# Patient Record
Sex: MALE | Race: OTHER | Employment: FULL TIME | URBAN - METROPOLITAN AREA
[De-identification: names, ages, dates, MRNs, and addresses within clinical notes are randomized per-mention and may not be internally consistent; named-entity substitution may affect disease eponyms.]

---

## 2017-11-03 RX ORDER — SIMVASTATIN 20 MG
TABLET ORAL
Qty: 90 TABLET | Refills: 0 | Status: SHIPPED | OUTPATIENT
Start: 2017-11-03 | End: 2018-04-10

## 2018-04-10 RX ORDER — SIMVASTATIN 20 MG
TABLET ORAL
Qty: 90 TABLET | Refills: 0 | Status: SHIPPED | OUTPATIENT
Start: 2018-04-10 | End: 2018-05-08 | Stop reason: ALTCHOICE

## 2018-04-10 NOTE — TELEPHONE ENCOUNTER
Dr. Joshua Norton: patient called and asked for refill for simvastatin. Informed him he needs to schedule physical appt and labs are needed. LOV 2016. I transferred him to  to schedule appt.    In the meantime, patient asked if you can refill his

## 2018-05-08 ENCOUNTER — OFFICE VISIT (OUTPATIENT)
Dept: INTERNAL MEDICINE CLINIC | Facility: CLINIC | Age: 58
End: 2018-05-08

## 2018-05-08 VITALS
HEART RATE: 67 BPM | WEIGHT: 227 LBS | HEIGHT: 73.75 IN | BODY MASS INDEX: 29.45 KG/M2 | DIASTOLIC BLOOD PRESSURE: 74 MMHG | SYSTOLIC BLOOD PRESSURE: 122 MMHG

## 2018-05-08 DIAGNOSIS — E78.00 HYPERCHOLESTEROLEMIA: ICD-10-CM

## 2018-05-08 DIAGNOSIS — D12.6 ADENOMATOUS POLYP OF COLON, UNSPECIFIED PART OF COLON: ICD-10-CM

## 2018-05-08 DIAGNOSIS — Z00.00 ANNUAL PHYSICAL EXAM: Primary | ICD-10-CM

## 2018-05-08 PROCEDURE — 99396 PREV VISIT EST AGE 40-64: CPT | Performed by: INTERNAL MEDICINE

## 2018-05-08 RX ORDER — ATORVASTATIN CALCIUM 10 MG/1
10 TABLET, FILM COATED ORAL NIGHTLY
Qty: 30 TABLET | Refills: 12 | Status: SHIPPED | OUTPATIENT
Start: 2018-05-08 | End: 2019-05-09

## 2018-05-08 NOTE — H&P
Kyler Zafar is a 56year old male who presents for a complete physical exam.   HPI:   His last visit here was for a physical October 2016. He feels well. He is interested in switching his cholesterol medication.   He stopped simvastatin for about 2 mon Alcohol use: Yes              Comment: 2 glasses of wine 4 days weekly          REVIEW OF SYSTEMS:   GENERAL: No fever  LUNGS: Recent chest congestion and cough productive of yellow sputum for the past 2 weeks now resolving.   No wheezing or shortness at which time an antibiotic will be prescribed. Anticipate colonoscopy October 2018. Annual physical.  - COMP METABOLIC PANEL (14); Future  - CBC, PLATELET; NO DIFFERENTIAL; Future  - LIPID PANEL; Future  - PSA SCREEN; Future    2.  Hypercholesterolemia

## 2018-05-08 NOTE — PATIENT INSTRUCTIONS
Call if chest congestion and cough do not continue to go away. Stop simvastatin and begin atorvastatin 10 mg daily. Obtain blood tests soon when you can. Continue to eat healthy and exercise regularly.   You will be due for repeat colonoscopy darling Martinez

## 2018-06-09 ENCOUNTER — APPOINTMENT (OUTPATIENT)
Dept: LAB | Facility: HOSPITAL | Age: 58
End: 2018-06-09
Attending: INTERNAL MEDICINE
Payer: COMMERCIAL

## 2018-06-09 DIAGNOSIS — Z00.00 ANNUAL PHYSICAL EXAM: ICD-10-CM

## 2018-06-09 PROCEDURE — 80053 COMPREHEN METABOLIC PANEL: CPT

## 2018-06-09 PROCEDURE — 36415 COLL VENOUS BLD VENIPUNCTURE: CPT

## 2018-06-09 PROCEDURE — 80061 LIPID PANEL: CPT

## 2018-06-09 PROCEDURE — 85027 COMPLETE CBC AUTOMATED: CPT

## 2018-09-10 ENCOUNTER — PATIENT MESSAGE (OUTPATIENT)
Dept: INTERNAL MEDICINE CLINIC | Facility: CLINIC | Age: 58
End: 2018-09-10

## 2018-09-10 NOTE — TELEPHONE ENCOUNTER
From: Preston Florez  To: Idris Salcedo MD  Sent: 9/10/2018 11:37 AM CDT  Subject: Referral Request    I have been having tenderness and swelling in my left elbow (and less often left wrist). Has been off and on for 6-8 weeks.  I have rested it, done some

## 2019-05-09 RX ORDER — ATORVASTATIN CALCIUM 10 MG/1
TABLET, FILM COATED ORAL
Qty: 30 TABLET | Refills: 1 | Status: SHIPPED | OUTPATIENT
Start: 2019-05-09 | End: 2019-07-12

## 2019-05-21 ENCOUNTER — APPOINTMENT (OUTPATIENT)
Dept: LAB | Facility: HOSPITAL | Age: 59
End: 2019-05-21
Attending: INTERNAL MEDICINE
Payer: COMMERCIAL

## 2019-05-21 ENCOUNTER — OFFICE VISIT (OUTPATIENT)
Dept: INTERNAL MEDICINE CLINIC | Facility: CLINIC | Age: 59
End: 2019-05-21
Payer: COMMERCIAL

## 2019-05-21 VITALS
HEART RATE: 64 BPM | WEIGHT: 234 LBS | SYSTOLIC BLOOD PRESSURE: 124 MMHG | BODY MASS INDEX: 30.35 KG/M2 | HEIGHT: 73.75 IN | DIASTOLIC BLOOD PRESSURE: 76 MMHG

## 2019-05-21 DIAGNOSIS — D12.6 ADENOMATOUS POLYP OF COLON, UNSPECIFIED PART OF COLON: ICD-10-CM

## 2019-05-21 DIAGNOSIS — Z00.00 ANNUAL PHYSICAL EXAM: ICD-10-CM

## 2019-05-21 DIAGNOSIS — E78.00 HYPERCHOLESTEROLEMIA: ICD-10-CM

## 2019-05-21 DIAGNOSIS — Z00.00 ANNUAL PHYSICAL EXAM: Primary | ICD-10-CM

## 2019-05-21 PROCEDURE — 85027 COMPLETE CBC AUTOMATED: CPT

## 2019-05-21 PROCEDURE — 80053 COMPREHEN METABOLIC PANEL: CPT

## 2019-05-21 PROCEDURE — 80061 LIPID PANEL: CPT

## 2019-05-21 PROCEDURE — 99396 PREV VISIT EST AGE 40-64: CPT | Performed by: INTERNAL MEDICINE

## 2019-05-21 PROCEDURE — 36415 COLL VENOUS BLD VENIPUNCTURE: CPT

## 2019-05-21 NOTE — H&P
Carlos Cornell is a 66year old male who presents for a complete physical exam.   HPI:   His last physical was May 2018. He feels well. No specific issues for discussion today. He is tolerating atorvastatin better than simvastatin.   Diet healthy, and he palpitations or chest pain  GI: No anorexia heartburn dysphagia nausea vomiting abdominal pain diarrhea constipation or rectal bleeding  : No urinary frequency dysuria or hematuria  MUSCULOSKELETAL: Recent left elbow pain, now resolved.   Occasional mild

## 2019-05-21 NOTE — PATIENT INSTRUCTIONS
Await results of today's blood tests. Please contact GI to arrange repeat colonoscopy. Continue current medication. Continue healthy diet and regular exercise with hopefully some weight loss.   Annual physical.

## 2019-07-13 RX ORDER — ATORVASTATIN CALCIUM 10 MG/1
TABLET, FILM COATED ORAL
Qty: 90 TABLET | Refills: 1 | Status: SHIPPED | OUTPATIENT
Start: 2019-07-13 | End: 2020-01-31

## 2019-07-13 NOTE — TELEPHONE ENCOUNTER
Refill passed per [facility]. AND [facility] protocol.   Cholesterol Medications  Protocol Criteria:  · Appointment scheduled in the past 12 months or in the next 3 months  · ALT & LDL on file in the past 12 months  · ALT result < 80  · LDL result <130   Recent Outpat

## 2020-01-31 RX ORDER — ATORVASTATIN CALCIUM 10 MG/1
TABLET, FILM COATED ORAL
Qty: 90 TABLET | Refills: 1 | Status: SHIPPED | OUTPATIENT
Start: 2020-01-31 | End: 2020-09-02

## 2020-01-31 NOTE — TELEPHONE ENCOUNTER
Refill passed per [facility] protocol.   Cholesterol Medications  Protocol Criteria:  · Appointment scheduled in the past 12 months or in the next 3 months  · ALT & LDL on file in the past 12 months  · ALT result < 80  · LDL result <130   Recent Outpat

## 2020-06-29 ENCOUNTER — TELEPHONE (OUTPATIENT)
Dept: GASTROENTEROLOGY | Facility: CLINIC | Age: 60
End: 2020-06-29

## 2020-06-29 ENCOUNTER — PATIENT MESSAGE (OUTPATIENT)
Dept: GASTROENTEROLOGY | Facility: CLINIC | Age: 60
End: 2020-06-29

## 2020-06-29 ENCOUNTER — VIRTUAL PHONE E/M (OUTPATIENT)
Dept: GASTROENTEROLOGY | Facility: CLINIC | Age: 60
End: 2020-06-29
Payer: COMMERCIAL

## 2020-06-29 VITALS — WEIGHT: 215 LBS | HEIGHT: 75 IN | BODY MASS INDEX: 26.73 KG/M2

## 2020-06-29 DIAGNOSIS — Z86.010 HISTORY OF COLON POLYPS: ICD-10-CM

## 2020-06-29 DIAGNOSIS — Z80.0 FAMILY HISTORY OF COLON CANCER: Primary | ICD-10-CM

## 2020-06-29 DIAGNOSIS — R19.4 ALTERED BOWEL HABITS: ICD-10-CM

## 2020-06-29 PROCEDURE — 99203 OFFICE O/P NEW LOW 30 MIN: CPT | Performed by: NURSE PRACTITIONER

## 2020-06-29 RX ORDER — POLYETHYLENE GLYCOL 3350, SODIUM CHLORIDE, SODIUM BICARBONATE, POTASSIUM CHLORIDE 420; 11.2; 5.72; 1.48 G/4L; G/4L; G/4L; G/4L
POWDER, FOR SOLUTION ORAL
Qty: 1 BOTTLE | Refills: 0 | Status: SHIPPED | OUTPATIENT
Start: 2020-06-29 | End: 2020-12-22 | Stop reason: ALTCHOICE

## 2020-06-29 NOTE — TELEPHONE ENCOUNTER
Scheduling: Please schedule the patient for a nonurgent colonoscopy related to a history of colon polyps/family history of colon cancer. Please note, he has an acute gastroenteritis at present the it seems to be self improving.   He is aware to schedule hi

## 2020-06-29 NOTE — TELEPHONE ENCOUNTER
Scheduled for:  Colonoscopy 39792  Provider Name:    Date: 8/7/20   Location:  [facility]  Sedation:  mac  Time:  1030am pt told will get call for arrival time  Prep:  colyte  Meds/Allergies Reconciled?:  Shahnaz CHRISTIANSENN reviewed    Diagnosis wi

## 2020-06-30 NOTE — TELEPHONE ENCOUNTER
Schedulers-Please see below MyChart message from this patient. He would like to reschedule his procedure.       Thank you      Visit Follow-up Question  6/29/2020 6:57 PM Reply    To: [facility] CLINICAL STAFF      From: Christie Florez      Created: 6/29/2020 6

## 2020-06-30 NOTE — TELEPHONE ENCOUNTER
Rescheduled for:  Colonoscopy 88975  Provider Name:    Date: 8/7/20 To :8/21/2020  Location:  [facility]  Sedation:  mac  Time: From: 1030am To: 9:45am  Meds/Allergies Reconciled?:  Steph TAO reviewed     Diagnosis with codes:  [facility] Z86.010

## 2020-06-30 NOTE — TELEPHONE ENCOUNTER
From: Jesus Florez  To: RAÚL Last  Sent: 6/29/2020 6:57 PM CDT  Subject: Visit Follow-up Question    I screwed up when she called to schedule my appointment. I took Friday Aug 7th but I can't do that date. I need to reschedule.  Don't know who

## 2020-08-10 ENCOUNTER — TELEPHONE (OUTPATIENT)
Dept: GASTROENTEROLOGY | Facility: CLINIC | Age: 60
End: 2020-08-10

## 2020-08-10 NOTE — TELEPHONE ENCOUNTER
Rescheduled for:  Colonoscopy 12929  Provider Name:  Dr. Quan Farley  Date:   From-8/21/20  To-10/23/20      Location:    [facility]  Sedation:  MAC  Time:     4220 (pt is aware that [facility] will call the day before to confirm arrival time)   Prep:  Trilyte, sent ne

## 2020-09-02 RX ORDER — ATORVASTATIN CALCIUM 10 MG/1
TABLET, FILM COATED ORAL
Qty: 90 TABLET | Refills: 0 | Status: SHIPPED | OUTPATIENT
Start: 2020-09-02 | End: 2020-09-28

## 2020-09-28 ENCOUNTER — TELEPHONE (OUTPATIENT)
Dept: GASTROENTEROLOGY | Facility: CLINIC | Age: 60
End: 2020-09-28

## 2020-09-28 RX ORDER — ATORVASTATIN CALCIUM 10 MG/1
TABLET, FILM COATED ORAL
Qty: 90 TABLET | Refills: 0 | Status: SHIPPED | OUTPATIENT
Start: 2020-09-28 | End: 2020-12-22

## 2020-09-28 NOTE — TELEPHONE ENCOUNTER
Patient requesting refill of Atrovastatin 10 mg today. States he will be making an appointment to see Dr. Reggie Omalley at the end of October.

## 2020-10-02 NOTE — TELEPHONE ENCOUNTER
Per phone room TE, pt needs to cancel & reschedule procedure. CBLM to reschedule procedure. Pt notified of CANCELLATION on VM box. Canceled in [address] Electronic case CANCEL request was sent to [facility] via OMP.     Please transfer to [facility] at ext 1

## 2020-10-05 ENCOUNTER — TELEPHONE (OUTPATIENT)
Dept: GASTROENTEROLOGY | Facility: CLINIC | Age: 60
End: 2020-10-05

## 2020-10-05 DIAGNOSIS — Z80.0 FAMILY HISTORY OF COLON CANCER: ICD-10-CM

## 2020-10-05 DIAGNOSIS — Z86.010 PERSONAL HISTORY OF COLONIC POLYPS: Primary | ICD-10-CM

## 2020-10-05 NOTE — TELEPHONE ENCOUNTER
Scheduled for:  Colonoscopy - 60334  Provider Name:  Dr. Viv Klein  Date:  12/18/20                    Location:   [facility]  Sedation:  MAC  Time:  Approx 10:45 am (pt is aware that [facility] will call the day before to confirm arrival time)   Prep:  Pattie, sent

## 2020-12-15 ENCOUNTER — LAB REQUISITION (OUTPATIENT)
Dept: LAB | Facility: HOSPITAL | Age: 60
End: 2020-12-15
Payer: COMMERCIAL

## 2020-12-15 DIAGNOSIS — Z01.818 ENCOUNTER FOR OTHER PREPROCEDURAL EXAMINATION: ICD-10-CM

## 2020-12-18 ENCOUNTER — LAB REQUISITION (OUTPATIENT)
Dept: LAB | Facility: HOSPITAL | Age: 60
End: 2020-12-18
Payer: COMMERCIAL

## 2020-12-18 ENCOUNTER — SURGERY CENTER DOCUMENTATION (OUTPATIENT)
Dept: SURGERY | Age: 60
End: 2020-12-18

## 2020-12-18 DIAGNOSIS — K63.5 POLYP OF COLON: ICD-10-CM

## 2020-12-18 PROCEDURE — 45385 COLONOSCOPY W/LESION REMOVAL: CPT | Performed by: INTERNAL MEDICINE

## 2020-12-18 PROCEDURE — 88305 TISSUE EXAM BY PATHOLOGIST: CPT | Performed by: INTERNAL MEDICINE

## 2020-12-18 NOTE — PROCEDURES
[facility] Endoscopy Report      Date of Procedure:  12/18/20      Preoperative Diagnosis:  1. Colorectal cancer screening  2. Family history of colon cancer  3.   Personal history of adenomatous colon polyp      Postoperative Zuleyka retrieved. Inspection of all sites revealed no evidence of ongoing bleeding. There were a few small diverticula seen in the sigmoid colon without signs of complication.   There were no other colonic polyps, mass lesions, vascular anomalies or signs of i

## 2020-12-22 ENCOUNTER — OFFICE VISIT (OUTPATIENT)
Dept: INTERNAL MEDICINE CLINIC | Facility: CLINIC | Age: 60
End: 2020-12-22
Payer: COMMERCIAL

## 2020-12-22 VITALS
HEIGHT: 73.75 IN | SYSTOLIC BLOOD PRESSURE: 112 MMHG | BODY MASS INDEX: 28.26 KG/M2 | WEIGHT: 217.88 LBS | HEART RATE: 59 BPM | DIASTOLIC BLOOD PRESSURE: 71 MMHG

## 2020-12-22 DIAGNOSIS — Z00.00 ANNUAL PHYSICAL EXAM: Primary | ICD-10-CM

## 2020-12-22 DIAGNOSIS — D12.6 ADENOMATOUS POLYP OF COLON, UNSPECIFIED PART OF COLON: ICD-10-CM

## 2020-12-22 DIAGNOSIS — E78.00 HYPERCHOLESTEROLEMIA: ICD-10-CM

## 2020-12-22 PROCEDURE — 99396 PREV VISIT EST AGE 40-64: CPT | Performed by: INTERNAL MEDICINE

## 2020-12-22 PROCEDURE — 3078F DIAST BP <80 MM HG: CPT | Performed by: INTERNAL MEDICINE

## 2020-12-22 PROCEDURE — 3074F SYST BP LT 130 MM HG: CPT | Performed by: INTERNAL MEDICINE

## 2020-12-22 PROCEDURE — 3008F BODY MASS INDEX DOCD: CPT | Performed by: INTERNAL MEDICINE

## 2020-12-22 PROCEDURE — 90471 IMMUNIZATION ADMIN: CPT | Performed by: INTERNAL MEDICINE

## 2020-12-22 PROCEDURE — 90686 IIV4 VACC NO PRSV 0.5 ML IM: CPT | Performed by: INTERNAL MEDICINE

## 2020-12-22 RX ORDER — ATORVASTATIN CALCIUM 10 MG/1
10 TABLET, FILM COATED ORAL NIGHTLY
Qty: 90 TABLET | Refills: 3 | Status: SHIPPED | OUTPATIENT
Start: 2020-12-22 | End: 2021-01-04

## 2020-12-22 NOTE — H&P
Chelsie Kenney is a 64year old male who presents for a complete physical exam, as well as for follow-up of hypercholesterolemia. HPI:   His last visit here was for a physical in May 2019. He feels well. No specific issues for discussion.   He is interes Mother 68   • Melanoma Mother    • Hypertension Mother    • Other (Lung Cancer) Mother 79   • Hypertension Brother       Social History:  Social History    Tobacco Use      Smoking status: Never Smoker      Smokeless tobacco: Never Used    Alcohol use: Yes to pharmacy. Reinforced healthy diet and regular exercise. Annual physical.  - atorvastatin 10 MG Oral Tab; Take 1 tablet (10 mg total) by mouth nightly. Dispense: 90 tablet;  Refill: 3  - FLULAVAL INFLUENZA VACCINE QUAD PRESERVATIVE FREE 0.5 ML  - COMP

## 2020-12-22 NOTE — PATIENT INSTRUCTIONS
Your physical today was normal.  You received a flu shot today. Please obtain blood tests soon when you can. Continue atorvastatin. Continue healthy diet and regular exercise. Return visit in 1 year.

## 2020-12-23 ENCOUNTER — TELEPHONE (OUTPATIENT)
Dept: GASTROENTEROLOGY | Facility: CLINIC | Age: 60
End: 2020-12-23

## 2020-12-23 NOTE — TELEPHONE ENCOUNTER
----- Message from Mario Alberto Barber MD sent at 12/23/2020  3:26 PM CST -----  I spoke to Geraldo Shepherd. He is feeling well. He had #6 adenomatous polyps removed. I have discussed the significance.   I have recommended a high-fiber diet for diverticulosis and

## 2020-12-23 NOTE — TELEPHONE ENCOUNTER
Entered into [facility]. Recall CLN in 3 years per Dr. Barrett Bass. Last CLN done 12/18/2020. Recall entered into Patient Outreach for 12/18/2023. HM updated.

## 2021-01-04 DIAGNOSIS — Z00.00 ANNUAL PHYSICAL EXAM: ICD-10-CM

## 2021-01-04 RX ORDER — ATORVASTATIN CALCIUM 10 MG/1
10 TABLET, FILM COATED ORAL NIGHTLY
Qty: 90 TABLET | Refills: 3 | Status: SHIPPED | OUTPATIENT
Start: 2021-01-04 | End: 2021-03-29 | Stop reason: DRUGHIGH

## 2021-01-08 ENCOUNTER — LAB ENCOUNTER (OUTPATIENT)
Dept: LAB | Facility: HOSPITAL | Age: 61
End: 2021-01-08
Attending: INTERNAL MEDICINE
Payer: COMMERCIAL

## 2021-01-08 DIAGNOSIS — Z00.00 ANNUAL PHYSICAL EXAM: ICD-10-CM

## 2021-01-08 LAB
ALBUMIN SERPL-MCNC: 3.9 G/DL (ref 3.4–5)
ALBUMIN/GLOB SERPL: 1.3 {RATIO} (ref 1–2)
ALP LIVER SERPL-CCNC: 89 U/L
ALT SERPL-CCNC: 68 U/L
ANION GAP SERPL CALC-SCNC: 5 MMOL/L (ref 0–18)
AST SERPL-CCNC: 31 U/L (ref 15–37)
BILIRUB SERPL-MCNC: 0.8 MG/DL (ref 0.1–2)
BUN BLD-MCNC: 17 MG/DL (ref 7–18)
BUN/CREAT SERPL: 18.5 (ref 10–20)
CALCIUM BLD-MCNC: 9.3 MG/DL (ref 8.5–10.1)
CHLORIDE SERPL-SCNC: 108 MMOL/L (ref 98–112)
CHOLEST SMN-MCNC: 233 MG/DL (ref ?–200)
CO2 SERPL-SCNC: 26 MMOL/L (ref 21–32)
COMPLEXED PSA SERPL-MCNC: 0.52 NG/ML (ref ?–4)
CREAT BLD-MCNC: 0.92 MG/DL
DEPRECATED RDW RBC AUTO: 44.3 FL (ref 35.1–46.3)
ERYTHROCYTE [DISTWIDTH] IN BLOOD BY AUTOMATED COUNT: 12.5 % (ref 11–15)
GLOBULIN PLAS-MCNC: 3.1 G/DL (ref 2.8–4.4)
GLUCOSE BLD-MCNC: 101 MG/DL (ref 70–99)
HCT VFR BLD AUTO: 43.5 %
HDLC SERPL-MCNC: 53 MG/DL (ref 40–59)
HGB BLD-MCNC: 15.1 G/DL
LDLC SERPL CALC-MCNC: 151 MG/DL (ref ?–100)
M PROTEIN MFR SERPL ELPH: 7 G/DL (ref 6.4–8.2)
MCH RBC QN AUTO: 33.6 PG (ref 26–34)
MCHC RBC AUTO-ENTMCNC: 34.7 G/DL (ref 31–37)
MCV RBC AUTO: 96.7 FL
NONHDLC SERPL-MCNC: 180 MG/DL (ref ?–130)
OSMOLALITY SERPL CALC.SUM OF ELEC: 290 MOSM/KG (ref 275–295)
PATIENT FASTING Y/N/NP: YES
PATIENT FASTING Y/N/NP: YES
PLATELET # BLD AUTO: 184 10(3)UL (ref 150–450)
POTASSIUM SERPL-SCNC: 3.8 MMOL/L (ref 3.5–5.1)
RBC # BLD AUTO: 4.5 X10(6)UL
SARS-COV-2 IGG+IGM SERPL QL IA: REACTIVE
SODIUM SERPL-SCNC: 139 MMOL/L (ref 136–145)
TRIGL SERPL-MCNC: 144 MG/DL (ref 30–149)
VLDLC SERPL CALC-MCNC: 29 MG/DL (ref 0–30)
WBC # BLD AUTO: 5.6 X10(3) UL (ref 4–11)

## 2021-01-08 PROCEDURE — 86769 SARS-COV-2 COVID-19 ANTIBODY: CPT

## 2021-01-08 PROCEDURE — 80061 LIPID PANEL: CPT

## 2021-01-08 PROCEDURE — 36415 COLL VENOUS BLD VENIPUNCTURE: CPT

## 2021-01-08 PROCEDURE — 80053 COMPREHEN METABOLIC PANEL: CPT

## 2021-01-08 PROCEDURE — 85027 COMPLETE CBC AUTOMATED: CPT

## 2021-03-29 ENCOUNTER — PATIENT MESSAGE (OUTPATIENT)
Dept: INTERNAL MEDICINE CLINIC | Facility: CLINIC | Age: 61
End: 2021-03-29

## 2021-03-29 RX ORDER — ATORVASTATIN CALCIUM 20 MG/1
20 TABLET, FILM COATED ORAL NIGHTLY
Qty: 90 TABLET | Refills: 2 | Status: SHIPPED | OUTPATIENT
Start: 2021-03-29 | End: 2021-03-31

## 2021-03-29 NOTE — TELEPHONE ENCOUNTER
From: Amita Florez  To: Amira Elise MD  Sent: 3/29/2021 7:31 AM CDT  Subject: Prescription Question    Your comment on my COMP METABOLIC PANEL 9/5/18 test result recommended to go to 20mg Atorvastatin.  My current prescription is up and I'd like to inc

## 2021-03-31 RX ORDER — ATORVASTATIN CALCIUM 20 MG/1
20 TABLET, FILM COATED ORAL NIGHTLY
Qty: 90 TABLET | Refills: 2 | Status: SHIPPED | OUTPATIENT
Start: 2021-03-31

## 2022-01-11 ENCOUNTER — NURSE TRIAGE (OUTPATIENT)
Dept: INTERNAL MEDICINE CLINIC | Facility: CLINIC | Age: 62
End: 2022-01-11

## 2022-01-11 ENCOUNTER — PATIENT MESSAGE (OUTPATIENT)
Dept: INTERNAL MEDICINE CLINIC | Facility: CLINIC | Age: 62
End: 2022-01-11

## 2022-01-11 NOTE — TELEPHONE ENCOUNTER
Lisa Joe RN 1/11/2022 11:12 AM CST        ----- Message -----  From: Shima Florez  Sent: 1/11/2022 11:11 AM CST  To: Em Rn Triage  Subject: Trigger Thumb     I have had ongoing issue (last 4 months or so) with my left thumb.  I believe it is time t

## 2022-01-11 NOTE — TELEPHONE ENCOUNTER
Action Requested: Summary for Provider     []  Critical Lab, Recommendations Needed  [x] Need Additional Advice  []   FYI    []   Need Orders  [] Need Medications Sent to Pharmacy  []  Other     SUMMARY: Patient stated that has been having trouble with his

## 2022-01-11 NOTE — TELEPHONE ENCOUNTER
----- Message from Pierre Farfan RN sent at 1/11/2022 11:12 AM CST -----  Regarding: FW: Trigger Thumb      ----- Message -----  From: Ar Florez  Sent: 1/11/2022  11:11 AM CST  To: Ruthy Salazar Triage  Subject: Trigger Fabiola Hall

## 2022-01-11 NOTE — TELEPHONE ENCOUNTER
MD nicci Leyva          1/11/22 3:44 PM  [facility], you can schedule an appointment with Orthopedics (Dr. Kaylan Cobos, Dr. Lois Pringle or Dr. Michaela Quigley) at [phone]    Last read by Aleshia Parrish at 3:58 PM on 1/11/2022.

## 2022-01-31 ENCOUNTER — HOSPITAL ENCOUNTER (OUTPATIENT)
Dept: GENERAL RADIOLOGY | Facility: HOSPITAL | Age: 62
Discharge: HOME | End: 2022-01-31
Attending: ORTHOPAEDIC SURGERY
Payer: COMMERCIAL

## 2022-01-31 ENCOUNTER — OFFICE VISIT (OUTPATIENT)
Dept: ORTHOPEDICS CLINIC | Facility: CLINIC | Age: 62
End: 2022-01-31
Payer: COMMERCIAL

## 2022-01-31 VITALS — SYSTOLIC BLOOD PRESSURE: 114 MMHG | HEART RATE: 58 BPM | DIASTOLIC BLOOD PRESSURE: 67 MMHG

## 2022-01-31 DIAGNOSIS — M79.645 PAIN OF LEFT THUMB: ICD-10-CM

## 2022-01-31 DIAGNOSIS — M19.042 DEGENERATIVE ARTHRITIS OF METACARPOPHALANGEAL JOINT OF LEFT THUMB: ICD-10-CM

## 2022-01-31 DIAGNOSIS — M18.12 ARTHRITIS OF CARPOMETACARPAL (CMC) JOINT OF LEFT THUMB: Primary | ICD-10-CM

## 2022-01-31 PROCEDURE — 99244 OFF/OP CNSLTJ NEW/EST MOD 40: CPT | Performed by: ORTHOPAEDIC SURGERY

## 2022-01-31 PROCEDURE — 3078F DIAST BP <80 MM HG: CPT | Performed by: ORTHOPAEDIC SURGERY

## 2022-01-31 PROCEDURE — 3074F SYST BP LT 130 MM HG: CPT | Performed by: ORTHOPAEDIC SURGERY

## 2022-01-31 PROCEDURE — 73130 X-RAY EXAM OF HAND: CPT | Performed by: ORTHOPAEDIC SURGERY

## 2022-01-31 PROCEDURE — 20600 DRAIN/INJ JOINT/BURSA W/O US: CPT | Performed by: ORTHOPAEDIC SURGERY

## 2022-01-31 RX ORDER — TRIAMCINOLONE ACETONIDE 40 MG/ML
40 INJECTION, SUSPENSION INTRA-ARTICULAR; INTRAMUSCULAR ONCE
Status: DISCONTINUED | OUTPATIENT
Start: 2022-01-31 | End: 2022-01-31

## 2022-01-31 RX ORDER — TRIAMCINOLONE ACETONIDE 40 MG/ML
20 INJECTION, SUSPENSION INTRA-ARTICULAR; INTRAMUSCULAR ONCE
Status: COMPLETED | OUTPATIENT
Start: 2022-01-31 | End: 2022-01-31

## 2022-01-31 RX ADMIN — TRIAMCINOLONE ACETONIDE 20 MG: 40 INJECTION, SUSPENSION INTRA-ARTICULAR; INTRAMUSCULAR at 12:53:00

## 2022-01-31 NOTE — H&P
NURSING INTAKE COMMENTS: Patient presents with:  Finger Pain: Left thumb - onset during the summer, no injury - has pain in the thumb with radiation into the forearm rated as 4-5/10 on and off - sometimes has numbness and tingling in the fingers - pt is R Use      Smoking status: Never Smoker      Smokeless tobacco: Never Used    Vaping Use      Vaping Use: Never used    Substance and Sexual Activity      Alcohol use: Yes        Comment: 2 glasses of wine 4 days weekly      Drug use: No      Sexual activity (MSJ=54043)    Result Date: 1/31/2022  PROCEDURE: XR HAND (MIN 3 VIEWS), LEFT (CPT=73130)  COMPARISON: None. INDICATIONS: Intermittent left thumb pain, swelling, and  weakness post injury 5/2021. TECHNIQUE:    Three views were obtained.   FINDINGS: PARTHA CMC joint with bupivacaine 0.5% half cc and Kenalog half cc. He will learn the home exercise program from the therapist.  For now we will see him as needed. We talked about icing and ibuprofen use tonight if the injection site is a little sore.     Follow

## 2022-01-31 NOTE — PROGRESS NOTES
Per verbal order from Dr. Claudio Alba, draw up .5 of 0.5% Marcaine and .5 of Kenalog 40 for cortisone injection to left thumb Nallely Turner CMA  Patient provided education handout for cortisone injection.    Patient left office prior to obtaining post injection vit

## 2022-05-04 ENCOUNTER — LAB ENCOUNTER (OUTPATIENT)
Dept: LAB | Facility: HOSPITAL | Age: 62
End: 2022-05-04
Attending: INTERNAL MEDICINE
Payer: COMMERCIAL

## 2022-05-04 ENCOUNTER — OFFICE VISIT (OUTPATIENT)
Dept: INTERNAL MEDICINE CLINIC | Facility: CLINIC | Age: 62
End: 2022-05-04
Payer: COMMERCIAL

## 2022-05-04 VITALS
HEIGHT: 73.75 IN | SYSTOLIC BLOOD PRESSURE: 118 MMHG | BODY MASS INDEX: 27.48 KG/M2 | RESPIRATION RATE: 16 BRPM | WEIGHT: 211.81 LBS | HEART RATE: 53 BPM | DIASTOLIC BLOOD PRESSURE: 72 MMHG

## 2022-05-04 DIAGNOSIS — D12.6 ADENOMATOUS POLYP OF COLON, UNSPECIFIED PART OF COLON: ICD-10-CM

## 2022-05-04 DIAGNOSIS — Z00.00 ANNUAL PHYSICAL EXAM: Primary | ICD-10-CM

## 2022-05-04 DIAGNOSIS — E78.00 HYPERCHOLESTEROLEMIA: ICD-10-CM

## 2022-05-04 DIAGNOSIS — Z00.00 ANNUAL PHYSICAL EXAM: ICD-10-CM

## 2022-05-04 LAB
ALBUMIN SERPL-MCNC: 3.9 G/DL (ref 3.4–5)
ALBUMIN/GLOB SERPL: 1.3 {RATIO} (ref 1–2)
ALP LIVER SERPL-CCNC: 69 U/L
ALT SERPL-CCNC: 37 U/L
ANION GAP SERPL CALC-SCNC: 3 MMOL/L (ref 0–18)
AST SERPL-CCNC: 27 U/L (ref 15–37)
BILIRUB SERPL-MCNC: 0.9 MG/DL (ref 0.1–2)
BUN BLD-MCNC: 15 MG/DL (ref 7–18)
BUN/CREAT SERPL: 14.9 (ref 10–20)
CALCIUM BLD-MCNC: 8.4 MG/DL (ref 8.5–10.1)
CHLORIDE SERPL-SCNC: 106 MMOL/L (ref 98–112)
CHOLEST SERPL-MCNC: 168 MG/DL (ref ?–200)
CO2 SERPL-SCNC: 29 MMOL/L (ref 21–32)
COMPLEXED PSA SERPL-MCNC: 0.59 NG/ML (ref ?–4)
CREAT BLD-MCNC: 1.01 MG/DL
DEPRECATED RDW RBC AUTO: 45.2 FL (ref 35.1–46.3)
ERYTHROCYTE [DISTWIDTH] IN BLOOD BY AUTOMATED COUNT: 12.2 % (ref 11–15)
FASTING PATIENT LIPID ANSWER: YES
FASTING STATUS PATIENT QL REPORTED: YES
GLOBULIN PLAS-MCNC: 3 G/DL (ref 2.8–4.4)
GLUCOSE BLD-MCNC: 89 MG/DL (ref 70–99)
HCT VFR BLD AUTO: 43 %
HDLC SERPL-MCNC: 56 MG/DL (ref 40–59)
HGB BLD-MCNC: 14.3 G/DL
LDLC SERPL CALC-MCNC: 96 MG/DL (ref ?–100)
MCH RBC QN AUTO: 33.1 PG (ref 26–34)
MCHC RBC AUTO-ENTMCNC: 33.3 G/DL (ref 31–37)
MCV RBC AUTO: 99.5 FL
NONHDLC SERPL-MCNC: 112 MG/DL (ref ?–130)
OSMOLALITY SERPL CALC.SUM OF ELEC: 286 MOSM/KG (ref 275–295)
PLATELET # BLD AUTO: 167 10(3)UL (ref 150–450)
POTASSIUM SERPL-SCNC: 4.1 MMOL/L (ref 3.5–5.1)
PROT SERPL-MCNC: 6.9 G/DL (ref 6.4–8.2)
RBC # BLD AUTO: 4.32 X10(6)UL
SODIUM SERPL-SCNC: 138 MMOL/L (ref 136–145)
TRIGL SERPL-MCNC: 89 MG/DL (ref 30–149)
VLDLC SERPL CALC-MCNC: 15 MG/DL (ref 0–30)
WBC # BLD AUTO: 5.2 X10(3) UL (ref 4–11)

## 2022-05-04 PROCEDURE — 3078F DIAST BP <80 MM HG: CPT | Performed by: INTERNAL MEDICINE

## 2022-05-04 PROCEDURE — 36415 COLL VENOUS BLD VENIPUNCTURE: CPT

## 2022-05-04 PROCEDURE — 85027 COMPLETE CBC AUTOMATED: CPT

## 2022-05-04 PROCEDURE — 99396 PREV VISIT EST AGE 40-64: CPT | Performed by: INTERNAL MEDICINE

## 2022-05-04 PROCEDURE — 3008F BODY MASS INDEX DOCD: CPT | Performed by: INTERNAL MEDICINE

## 2022-05-04 PROCEDURE — 3074F SYST BP LT 130 MM HG: CPT | Performed by: INTERNAL MEDICINE

## 2022-05-04 PROCEDURE — 80053 COMPREHEN METABOLIC PANEL: CPT

## 2022-05-04 PROCEDURE — 80061 LIPID PANEL: CPT

## 2022-05-04 RX ORDER — ATORVASTATIN CALCIUM 20 MG/1
20 TABLET, FILM COATED ORAL NIGHTLY
Qty: 90 TABLET | Refills: 2 | Status: CANCELLED | OUTPATIENT
Start: 2022-05-04

## 2022-05-04 NOTE — PATIENT INSTRUCTIONS
Await results of today's blood tests. Consider getting 2 doses of Shingrix vaccine at your pharmacy. Continue healthy diet and regular exercise. Annual physical.  Consider seeing GI soon to discuss difficulty swallowing pills.

## 2022-06-28 ENCOUNTER — TELEPHONE (OUTPATIENT)
Dept: ORTHOPEDICS CLINIC | Facility: CLINIC | Age: 62
End: 2022-06-28

## 2022-06-28 NOTE — TELEPHONE ENCOUNTER
S/w pt and he states 1 month ago he was lifting a grill and after he had severe pain in left hip /groin. He used ice/heat, took advil and pain got better but then he was lifting something again this morning and pain returned. I advised pt he should be evaluated at [facility] or PCP. No openings in ortho for several weeks. He will CB if he wants to schedule appt in ortho after being evaluated by UC or PCP.

## 2022-06-28 NOTE — TELEPHONE ENCOUNTER
Per pt is having bad hip pain, states he cannot wait until next available appt, requesting to speak to RN. Please call thank you.

## 2022-06-29 ENCOUNTER — OFFICE VISIT (OUTPATIENT)
Dept: INTERNAL MEDICINE CLINIC | Facility: CLINIC | Age: 62
End: 2022-06-29
Payer: COMMERCIAL

## 2022-06-29 ENCOUNTER — HOSPITAL ENCOUNTER (OUTPATIENT)
Dept: GENERAL RADIOLOGY | Facility: HOSPITAL | Age: 62
Discharge: HOME | End: 2022-06-29
Attending: INTERNAL MEDICINE
Payer: COMMERCIAL

## 2022-06-29 VITALS
SYSTOLIC BLOOD PRESSURE: 108 MMHG | HEART RATE: 67 BPM | BODY MASS INDEX: 27.82 KG/M2 | HEIGHT: 73.75 IN | WEIGHT: 214.5 LBS | DIASTOLIC BLOOD PRESSURE: 69 MMHG

## 2022-06-29 DIAGNOSIS — R10.32 LEFT GROIN PAIN: Primary | ICD-10-CM

## 2022-06-29 DIAGNOSIS — R10.32 LEFT GROIN PAIN: ICD-10-CM

## 2022-06-29 PROCEDURE — 73502 X-RAY EXAM HIP UNI 2-3 VIEWS: CPT | Performed by: INTERNAL MEDICINE

## 2022-06-29 PROCEDURE — 3008F BODY MASS INDEX DOCD: CPT | Performed by: INTERNAL MEDICINE

## 2022-06-29 PROCEDURE — 3078F DIAST BP <80 MM HG: CPT | Performed by: INTERNAL MEDICINE

## 2022-06-29 PROCEDURE — 99213 OFFICE O/P EST LOW 20 MIN: CPT | Performed by: INTERNAL MEDICINE

## 2022-06-29 PROCEDURE — 3074F SYST BP LT 130 MM HG: CPT | Performed by: INTERNAL MEDICINE

## 2022-06-29 RX ORDER — ATORVASTATIN CALCIUM 20 MG/1
20 TABLET, FILM COATED ORAL NIGHTLY
Qty: 90 TABLET | Refills: 3 | Status: SHIPPED | OUTPATIENT
Start: 2022-06-29

## 2022-06-29 RX ORDER — NAPROXEN 500 MG/1
500 TABLET ORAL 2 TIMES DAILY WITH MEALS
Qty: 30 TABLET | Refills: 0 | Status: SHIPPED | OUTPATIENT
Start: 2022-06-29 | End: 2023-06-24

## 2022-06-29 NOTE — PATIENT INSTRUCTIONS
Await results of left hip x-ray. Please rest and apply heat or ice to your left hip 2-3 times daily. Take naproxen 500 mg twice daily with meals. Call if not soon better.

## 2022-08-07 NOTE — H&P
[facility] Medicine Services  HISTORY AND PHYSICAL    Patient Name: Shira Rogers  : 1945  MRN: [id number]  Primary Care Physician: Miguel Mazariegos MD  Date of admission: 2022      Subjective   Subjective     Chief Complaint: fall, left elbow and knee pain    HPI:  Shira Rogers is a 77 y.o. female with history of hypertension, type 2 diabetes, hypothyroidism on Synthroid, hyperlipidemia, dizziness on meclizine who presents to the ED s/p fall.  She states that she was about to go down some steps when she lost her balance and fell. She does endorse congestion and dizziness and has been taking meclizine. She denies any f/c, no n/v/d. On arrival to the ED patient was hypertensive, initial lab work-up was unremarkable, however, x-ray was concerning for distal left humerus fracture, and left patella fracture.  Ortho was consulted, and hospital medicine was asked to admit.    Review of Systems   Gen- No fevers, chills  CV- No chest pain, palpitations  Resp- No cough, dyspnea  GI- No N/V/D, abd pain    All other systems reviewed and are negative.     Personal History     No past medical history on file.      No past surgical history on file.    Family History:  family history is not on file. Otherwise pertinent FHx was reviewed and unremarkable.     Social History:    Social History     Social History Narrative   • Not on file       Medications:  Available home medication information reviewed.  (Not in a hospital admission)      No Known Allergies    Objective   Objective     Vital Signs:   Temp:  [98.6 °F (37 °C)] 98.6 °F (37 °C)  Heart Rate:  [71-87] 81  Resp:  [18] 18  BP: (137-164)/(81-92) 137/92       Physical Exam   Constitutional: Awake, alert  Eyes: PERRLA, sclerae anicteric, no conjunctival injection  HENT: NCAT, mucous membranes moist  Neck: Supple, no thyromegaly, no lymphadenopathy, trachea midline  Respiratory: Clear to auscultation bilaterally, nonlabored respirations  PATRICIA Marielle Tele-visit    Parent/Caregiver verbally consents to a Virtual/Telephone Check-In service on 6/29/2020    Patient understands and accepts financial responsibility for any deductible, co-insurance and/or co-pays associated with this service.     This   Cardiovascular: RRR, no murmurs, rubs, or gallops, palpable pedal pulses bilaterally  Gastrointestinal: Positive bowel sounds, soft, nontender, nondistended  Musculoskeletal: No bilateral ankle edema, left upper extremity under splint, left leg in brace, no clubbing or cyanosis to extremities  Psychiatric: Appropriate affect, cooperative  Neurologic: Oriented x 3, strength symmetric in all extremities, Cranial Nerves grossly intact to confrontation, speech clear  Skin: No rashes    Result Review:  I have personally reviewed the results from the time of this admission to 8/7/2022 11:14 EDT and agree with these findings:  [x]  Laboratory list / accordion  []  Microbiology  [x]  Radiology  []  EKG/Telemetry   []  Cardiology/Vascular   []  Pathology  []  Old records  []  Other:  Most notable findings include:    LAB RESULTS:      Lab 08/07/22  0827   WBC 10.66   HEMOGLOBIN 12.1   HEMATOCRIT 34.9   PLATELETS 174   NEUTROS ABS 7.95*   IMMATURE GRANS (ABS) 0.04   LYMPHS ABS 1.78   MONOS ABS 0.63   EOS ABS 0.22   MCV 85.5         Lab 08/07/22  0827   SODIUM 135*   POTASSIUM 4.1   CHLORIDE 103   CO2 23.0   ANION GAP 9.0   BUN 23   CREATININE 0.70   EGFR 90.3   GLUCOSE 294*   CALCIUM 9.0         Lab 08/07/22  0827   TOTAL PROTEIN 6.7   ALBUMIN 4.00   GLOBULIN 2.7   ALT (SGPT) 15   AST (SGOT) 17   BILIRUBIN 0.3   ALK PHOS 91                         Microbiology Results (last 10 days)     ** No results found for the last 240 hours. **          XR ELBOW 2 VIEW LEFT    Result Date: 8/7/2022  DATE OF EXAM: 8/7/2022 7:39 AM  PROCEDURE: XR ELBOW 2 VW LEFT-  INDICATIONS: fall  COMPARISON: No comparisons available.  TECHNIQUE: Two Radiologic views of the left elbow were obtained.  FINDINGS: There is an acute supracondylar fracture of the distal humerus which on AP view appears to span transversely just proximal from the medial to the lateral epicondyles. Moderate effusion is present.      Impression: There is an acute supracondylar  Occupation: Real Estate   - Lives with spouse  - NSAIDs/ASA use: PRN    Past Medical History:   Diagnosis Date   • Colon polyps October 2013   • Hypercholesterolemia       Past Surgical History:   Procedure Laterality Date   • EYE SURGERY Bilateral Decembe cooperative     ASSESSMENT AND PLAN:   Chapin Hernandez is a 64year old year-old male pt of Dr. Kyra Taylor with history of colon polyps, hypercholesteremia, who presents for tele-visit. 1. Altered bowel habits:  The patient reports the onset of diarrheal sto fracture of the distal humerus which on AP view appears to span transversely just proximal from the medial to the lateral epicondyles. Moderate effusion is present.  This report was finalized on 8/7/2022 8:06 AM by Isac Avelar.      XR Knee 1 or 2 View Left    Result Date: 8/7/2022  DATE OF EXAM: 8/7/2022 7:39 AM  PROCEDURE: XR KNEE 1 OR 2 VW LEFT-  INDICATIONS: left knee  COMPARISON: No comparisons available.  TECHNIQUE: One to two radiologic views of left knee were obtained.  FINDINGS: There is cortical disruption along the superior pole of the patella concerning for acute minimally displaced fracture. Cortical margins are otherwise intact and alignment appears grossly maintained. Tricompartmental arthrosis changes are present, fairly severe. There is a moderate to large dense suprapatellar effusion, likely some component of hemarthrosis.      Impression: There is cortical disruption along the superior pole of the patella concerning for acute minimally displaced fracture. Cortical margins are otherwise intact and alignment appears grossly maintained. Tricompartmental arthrosis changes are present, fairly severe. There is a moderate to large dense suprapatellar effusion, likely some component of hemarthrosis.  This report was finalized on 8/7/2022 8:02 AM by Isac Avelar.            Assessment & Plan   Assessment & Plan     Active Hospital Problems    Diagnosis  POA   • **Fall [W19.XXXA]  Yes   • Type 2 diabetes mellitus (HCC) [E11.9]  Yes   • Hypothyroidism [E03.9]  Yes   • Essential hypertension [I10]  Yes   • Left supracondylar humerus fracture [S42.412A]  Yes   • Closed fracture of left patella [S82.002A]  Yes     77-year-old female with history of hypertension, hyperlipidemia, type 2 diabetes, hypothyroidism, chronic dizziness who presents to the ED status post fall, found to have  Left patella and left humerus fracture    Left patella fracture, left distal humerus fracture s/p fall  -Mechanism of  Yes  -Prep: Split dose Colyte/TriLyte or equivalent  -Anti-platelets and anti-coagulants: None  -Diabetes meds: None    ** If MAC @ EM/NE:    - HOLD ACE/ARBs the night before and/or the day of the procedure(s) - N/A   - NO alcohol, recreational drugs nor fall unknown, likely mechanical, patient with history of dizziness  -Left elbow and knee x-ray as above  -Ortho has been consulted, recommend nonoperative management for now, nonweightbearing LUE, WBAT LLE with knee immobilizer  -Continue pain control, PT/OT to evaluate    Type 2 diabetes  -Control unknown, will get A1c, continue SSI    Hyperlipidemia-continue statin once home med rec is done    Hypothyroidism  -Check baseline TSH, continue home Synthroid once home med rec is done    Hypertension  -BP currently stable, not on any meds  -Continue to monitor for now.    DVT prophylaxis: Mechanical    Addendum:  Screening COVID returned positive, patient is asymptomatic. She is vaccinated and boosted x1, we will place in airborne precautions    CODE STATUS:  full  Code Status and Medical Interventions:   Ordered at: 08/07/22 1113     Level Of Support Discussed With:    Patient     Code Status (Patient has no pulse and is not breathing):    CPR (Attempt to Resuscitate)     Medical Interventions (Patient has pulse or is breathing):    Full Support       Loan Sanz MD  08/07/22   provider-patient relationship, due to the ongoing public health crisis/national emergency and because of restrictions of visitation. There are limitations of this visit as limited physical exam could be performed.   Every conscious effort was taken to allo

## 2022-09-30 RX ORDER — ATORVASTATIN CALCIUM 20 MG/1
20 TABLET, FILM COATED ORAL NIGHTLY
Qty: 90 TABLET | Refills: 1 | Status: SHIPPED | OUTPATIENT
Start: 2022-09-30

## 2022-09-30 NOTE — TELEPHONE ENCOUNTER
Refill passed per [employer] [employer] protocol    Requested Prescriptions   Pending Prescriptions Disp Refills    atorvastatin 20 MG Oral Tab 90 tablet 3     Sig: Take 1 tablet (20 mg total) by mouth nightly.         Cholesterol Medication Protocol Passed - 9/30/2022 11:37 AM        Passed - ALT in past 12 months        Passed - LDL in past 12 months        Passed - Last ALT < 80       Lab Results   Component Value Date    ALT 37 05/04/2022             Passed - Last LDL < 130     Lab Results   Component Value Date    LDL 96 05/04/2022               Passed - In person appointment or virtual visit in the past 12 mos or appointment in next 3 mos       Recent Outpatient Visits              3 months ago Left groin pain    [facility], [address], Estela Nicholson MD    Office Visit    4 months ago Annual physical exam    [address] [facility], Estela Nicholson MD    Office Visit    8 months ago Arthritis of carpometacarpal O'Brien) joint of left thumb    [facility] Marcelino Navarrete, Tutu Carrera MD    Office Visit    1 year ago Annual physical exam    [facility], Estela Nicholson MD    Office Visit    2 years ago Family history of colon cancer    [employer], [facility], [address] [facility]    Virtual Phone E/M                           Recent Outpatient Visits              3 months ago Left groin pain    [facility]    Office Visit    4 months ago Annual physical exam    [facility]    Office Visit    8 months ago Arthritis of carpometacarpal O'Brien) joint of left thumb    [facility] Tutu Altamirano MD    Office Visit    1 year ago Annual physical exam    Rochelle Prajapati MD    Office Visit    2 years ago Family history of colon cancer    [facility], [address] [facility] [place], RAÚL Interiano    Virtual Phone E/M

## 2022-10-11 ENCOUNTER — PATIENT MESSAGE (OUTPATIENT)
Dept: GASTROENTEROLOGY | Facility: CLINIC | Age: 62
End: 2022-10-11

## 2022-10-11 RX ORDER — POLYETHYLENE GLYCOL 3350, SODIUM CHLORIDE, SODIUM BICARBONATE, POTASSIUM CHLORIDE 420; 11.2; 5.72; 1.48 G/4L; G/4L; G/4L; G/4L
4 POWDER, FOR SOLUTION ORAL ONCE
Qty: 4000 ML | Refills: 0 | Status: SHIPPED | OUTPATIENT
Start: 2022-10-11 | End: 2022-10-11 | Stop reason: CLARIF

## 2022-10-11 NOTE — TELEPHONE ENCOUNTER
Last Procedure, Date, MD:  12/18/2020, colonoscopy, Dr Jaylen Melendez  Last Diagnosis:  Tubular adenoma, sessile serrated adenoma  Recalled for (mth/yrs): 3 years  Sedation used previously:  MAC  Last Prep Used (if known):  trilyte  Quality of prep (if known): very good  Anticoagulants: no  Diabetic Meds: no  BP meds(Ace inhibitors/ARB's):no  Weight loss meds (phentermine/vyvanse):  Iron supplement (RX/OTC): no  Height & Weight/BMI: 6'1.75\"/214/27.73  Hx of Cardiac/CVA issues/(MI/Stroke): no  Devices Pacemaker/Defibrillator/Stents: no  Resp. Issues/Oxygen Use/MINDI/COPD:no  Issues w/Anesthesia:no    Symptoms (Y/N): abdominal cramping and heartburn  Symptoms Details:     Special comments/notes:    Please advise on orders and prep, thank you.

## 2022-10-11 NOTE — TELEPHONE ENCOUNTER
Dr Fiona Fernandez,    FYI:    I spoke to the pt and we scheduled a f/u appt/ date, time and location verified    Your Appointments    Friday November 04, 2022  3:30 PM  Follow Up Visit with Juarez Collado MD  [address] [facility], [address] [facility], [facility]) [address] [place] [address],[address]  [phone]           I called the pharmacy and cancelled the bowel prep.  I called [facility] and spoke with Robert Cruz the pharmacist

## 2022-10-11 NOTE — TELEPHONE ENCOUNTER
From: Luigi Florez  To: Alysia Alanis MD  Sent: 10/11/2022 10:43 AM CDT  Subject: Colonoscopy     I am a patient of you and Dr Rockford Felty. He recommends a colonoscopy in 2023.  I have been having some episodes of cramping and heartburn which is unusual. Wondering if it makes sense to move up the schedule

## 2022-10-11 NOTE — TELEPHONE ENCOUNTER
If the patient is having symptoms, it would be best for him to be seen in the office to further discuss. I would be happy to see him in the next few weeks. I inadvertently sent a TriLyte prescription of the pharmacy. Please cancel this prescription.

## 2022-11-04 ENCOUNTER — OFFICE VISIT (OUTPATIENT)
Facility: CLINIC | Age: 62
End: 2022-11-04
Payer: COMMERCIAL

## 2022-11-04 VITALS
WEIGHT: 215 LBS | DIASTOLIC BLOOD PRESSURE: 81 MMHG | SYSTOLIC BLOOD PRESSURE: 139 MMHG | HEIGHT: 73 IN | HEART RATE: 66 BPM | BODY MASS INDEX: 28.49 KG/M2

## 2022-11-04 DIAGNOSIS — R19.4 CHANGE IN BOWEL HABITS: ICD-10-CM

## 2022-11-04 DIAGNOSIS — K21.9 GASTROESOPHAGEAL REFLUX DISEASE, UNSPECIFIED WHETHER ESOPHAGITIS PRESENT: Primary | ICD-10-CM

## 2022-11-04 DIAGNOSIS — Z86.010 HISTORY OF COLON POLYPS: ICD-10-CM

## 2022-11-04 PROCEDURE — 3079F DIAST BP 80-89 MM HG: CPT | Performed by: INTERNAL MEDICINE

## 2022-11-04 PROCEDURE — 99213 OFFICE O/P EST LOW 20 MIN: CPT | Performed by: INTERNAL MEDICINE

## 2022-11-04 PROCEDURE — 3008F BODY MASS INDEX DOCD: CPT | Performed by: INTERNAL MEDICINE

## 2022-11-04 PROCEDURE — 3075F SYST BP GE 130 - 139MM HG: CPT | Performed by: INTERNAL MEDICINE

## 2022-11-05 ENCOUNTER — TELEPHONE (OUTPATIENT)
Dept: GASTROENTEROLOGY | Facility: CLINIC | Age: 62
End: 2022-11-05

## 2022-11-05 DIAGNOSIS — Z86.010 PERSONAL HISTORY OF COLONIC POLYPS: Primary | ICD-10-CM

## 2022-11-05 DIAGNOSIS — K21.9 GASTROESOPHAGEAL REFLUX DISEASE, UNSPECIFIED WHETHER ESOPHAGITIS PRESENT: ICD-10-CM

## 2022-11-05 DIAGNOSIS — Z80.0 FAMILY HISTORY OF COLON CANCER: ICD-10-CM

## 2022-11-05 DIAGNOSIS — R19.4 CHANGE IN BOWEL HABITS: ICD-10-CM

## 2022-11-05 DIAGNOSIS — R13.10 DYSPHAGIA, UNSPECIFIED TYPE: ICD-10-CM

## 2022-11-05 RX ORDER — POLYETHYLENE GLYCOL 3350, SODIUM CHLORIDE, SODIUM BICARBONATE, POTASSIUM CHLORIDE 420; 11.2; 5.72; 1.48 G/4L; G/4L; G/4L; G/4L
4 POWDER, FOR SOLUTION ORAL ONCE
Qty: 4000 ML | Refills: 0 | Status: SHIPPED | OUTPATIENT
Start: 2022-11-05 | End: 2022-11-05

## 2022-11-05 NOTE — TELEPHONE ENCOUNTER
GI schedulers: Please contact the patient to schedule a colonoscopy and upper endoscopy with possible esophageal dilatation in the next several weeks for a change in bowel habits, family history of colon cancer, a personal history of colon polyps, gastroesophageal reflux and dysphagia following a split dose TriLyte preparation monitored anesthesia care. We can go over the endoscopy schedule regarding timing if needed.

## 2022-11-05 NOTE — PATIENT INSTRUCTIONS
1.  We will contact you to schedule a colonoscopy and upper endoscopy. 2.  Further recommendations pending this result.

## 2022-11-07 NOTE — TELEPHONE ENCOUNTER
Scheduled for:  Colonoscopy [phone]  Provider Name:  Dr. Nadine Mg  Date:  11/21/2022  Location:  [facility]  Sedation:  MAC  Time:  2:30pm, (pt is aware to arrive at 1:30pm)  Prep: Trilyte  Meds/Allergies Reconciled?:  Physician reviewed    Diagnosis with codes:  Hx of colon polyps Z86.010, Family hx of colon cancer Z80.0, GERD K21.9, Change in Bowel Habits R19.4, Dysphagia R10.13  Was patient informed to call insurance with codes (Y/N):       Referral sent?:  Referral was sent at the time of electronic surgical scheduling. [facility] or [address] notified?:  I sent an electronic request to Endo Scheduling and received a confirmation today. Medication Orders: N/A  Misc Orders:  Patient was informed that they will need a COVID 19 test prior to their procedure. Patient verbally understood & will await a phone call from [facility] to schedule.      Further instructions given by staff:  I discussed the prep instructions with the patient which he  verbally understood and is aware that I will send the instructions via [facility]

## 2022-11-07 NOTE — TELEPHONE ENCOUNTER
Dr. Inessa Hardy-      I spoke with Antonette and she said you can do a 2:30pm at [facility] on 11/21. Let me know if that works for you.       Thanks

## 2022-11-18 ENCOUNTER — LAB ENCOUNTER (OUTPATIENT)
Dept: LAB | Facility: HOSPITAL | Age: 62
End: 2022-11-18
Attending: INTERNAL MEDICINE
Payer: COMMERCIAL

## 2022-11-18 DIAGNOSIS — Z01.818 PRE-OP TESTING: ICD-10-CM

## 2022-11-19 LAB — SARS-COV-2 RNA RESP QL NAA+PROBE: NOT DETECTED

## 2022-11-21 ENCOUNTER — HOSPITAL ENCOUNTER (OUTPATIENT)
Facility: HOSPITAL | Age: 62
Setting detail: HOSPITAL OUTPATIENT SURGERY
Discharge: HOME | End: 2022-11-21
Attending: INTERNAL MEDICINE | Admitting: INTERNAL MEDICINE
Payer: COMMERCIAL

## 2022-11-21 ENCOUNTER — ANESTHESIA EVENT (OUTPATIENT)
Dept: ENDOSCOPY | Facility: HOSPITAL | Age: 62
End: 2022-11-21
Payer: COMMERCIAL

## 2022-11-21 ENCOUNTER — ANESTHESIA (OUTPATIENT)
Dept: ENDOSCOPY | Facility: HOSPITAL | Age: 62
End: 2022-11-21
Payer: COMMERCIAL

## 2022-11-21 VITALS
RESPIRATION RATE: 12 BRPM | DIASTOLIC BLOOD PRESSURE: 76 MMHG | BODY MASS INDEX: 27.59 KG/M2 | WEIGHT: 215 LBS | HEIGHT: 74 IN | HEART RATE: 62 BPM | OXYGEN SATURATION: 100 % | SYSTOLIC BLOOD PRESSURE: 135 MMHG

## 2022-11-21 DIAGNOSIS — Z80.0 FAMILY HISTORY OF COLON CANCER: ICD-10-CM

## 2022-11-21 DIAGNOSIS — Z01.818 PRE-OP TESTING: Primary | ICD-10-CM

## 2022-11-21 DIAGNOSIS — Z86.010 PERSONAL HISTORY OF COLONIC POLYPS: ICD-10-CM

## 2022-11-21 DIAGNOSIS — K21.9 GASTROESOPHAGEAL REFLUX DISEASE, UNSPECIFIED WHETHER ESOPHAGITIS PRESENT: ICD-10-CM

## 2022-11-21 DIAGNOSIS — R13.10 DYSPHAGIA, UNSPECIFIED TYPE: ICD-10-CM

## 2022-11-21 DIAGNOSIS — R19.4 CHANGE IN BOWEL HABITS: ICD-10-CM

## 2022-11-21 PROCEDURE — 0DB98ZX EXCISION OF DUODENUM, VIA NATURAL OR ARTIFICIAL OPENING ENDOSCOPIC, DIAGNOSTIC: ICD-10-PCS | Performed by: INTERNAL MEDICINE

## 2022-11-21 PROCEDURE — 0DB78ZX EXCISION OF STOMACH, PYLORUS, VIA NATURAL OR ARTIFICIAL OPENING ENDOSCOPIC, DIAGNOSTIC: ICD-10-PCS | Performed by: INTERNAL MEDICINE

## 2022-11-21 PROCEDURE — 0DBK8ZX EXCISION OF ASCENDING COLON, VIA NATURAL OR ARTIFICIAL OPENING ENDOSCOPIC, DIAGNOSTIC: ICD-10-PCS | Performed by: INTERNAL MEDICINE

## 2022-11-21 PROCEDURE — 0DBP8ZX EXCISION OF RECTUM, VIA NATURAL OR ARTIFICIAL OPENING ENDOSCOPIC, DIAGNOSTIC: ICD-10-PCS | Performed by: INTERNAL MEDICINE

## 2022-11-21 PROCEDURE — 0DBL0ZX EXCISION OF TRANSVERSE COLON, OPEN APPROACH, DIAGNOSTIC: ICD-10-PCS | Performed by: INTERNAL MEDICINE

## 2022-11-21 PROCEDURE — 43249 ESOPH EGD DILATION <30 MM: CPT | Performed by: INTERNAL MEDICINE

## 2022-11-21 PROCEDURE — 0DBG8ZX EXCISION OF LEFT LARGE INTESTINE, VIA NATURAL OR ARTIFICIAL OPENING ENDOSCOPIC, DIAGNOSTIC: ICD-10-PCS | Performed by: INTERNAL MEDICINE

## 2022-11-21 PROCEDURE — 0DB48ZX EXCISION OF ESOPHAGOGASTRIC JUNCTION, VIA NATURAL OR ARTIFICIAL OPENING ENDOSCOPIC, DIAGNOSTIC: ICD-10-PCS | Performed by: INTERNAL MEDICINE

## 2022-11-21 PROCEDURE — 0DB38ZX EXCISION OF LOWER ESOPHAGUS, VIA NATURAL OR ARTIFICIAL OPENING ENDOSCOPIC, DIAGNOSTIC: ICD-10-PCS | Performed by: INTERNAL MEDICINE

## 2022-11-21 PROCEDURE — 0D748ZZ DILATION OF ESOPHAGOGASTRIC JUNCTION, VIA NATURAL OR ARTIFICIAL OPENING ENDOSCOPIC: ICD-10-PCS | Performed by: INTERNAL MEDICINE

## 2022-11-21 PROCEDURE — 45380 COLONOSCOPY AND BIOPSY: CPT | Performed by: INTERNAL MEDICINE

## 2022-11-21 PROCEDURE — 0DB18ZX EXCISION OF UPPER ESOPHAGUS, VIA NATURAL OR ARTIFICIAL OPENING ENDOSCOPIC, DIAGNOSTIC: ICD-10-PCS | Performed by: INTERNAL MEDICINE

## 2022-11-21 PROCEDURE — 0DBE8ZX EXCISION OF LARGE INTESTINE, VIA NATURAL OR ARTIFICIAL OPENING ENDOSCOPIC, DIAGNOSTIC: ICD-10-PCS | Performed by: INTERNAL MEDICINE

## 2022-11-21 PROCEDURE — 43239 EGD BIOPSY SINGLE/MULTIPLE: CPT | Performed by: INTERNAL MEDICINE

## 2022-11-21 PROCEDURE — 0DBF0ZX EXCISION OF RIGHT LARGE INTESTINE, OPEN APPROACH, DIAGNOSTIC: ICD-10-PCS | Performed by: INTERNAL MEDICINE

## 2022-11-21 PROCEDURE — 45385 COLONOSCOPY W/LESION REMOVAL: CPT | Performed by: INTERNAL MEDICINE

## 2022-11-21 PROCEDURE — 0DBM0ZX EXCISION OF DESCENDING COLON, OPEN APPROACH, DIAGNOSTIC: ICD-10-PCS | Performed by: INTERNAL MEDICINE

## 2022-11-21 RX ORDER — MIDAZOLAM HYDROCHLORIDE 1 MG/ML
INJECTION INTRAMUSCULAR; INTRAVENOUS AS NEEDED
Status: DISCONTINUED | OUTPATIENT
Start: 2022-11-21 | End: 2022-11-21 | Stop reason: SURG

## 2022-11-21 RX ORDER — LIDOCAINE HYDROCHLORIDE 10 MG/ML
INJECTION, SOLUTION EPIDURAL; INFILTRATION; INTRACAUDAL; PERINEURAL AS NEEDED
Status: DISCONTINUED | OUTPATIENT
Start: 2022-11-21 | End: 2022-11-21 | Stop reason: SURG

## 2022-11-21 RX ORDER — SODIUM CHLORIDE, SODIUM LACTATE, POTASSIUM CHLORIDE, CALCIUM CHLORIDE 600; 310; 30; 20 MG/100ML; MG/100ML; MG/100ML; MG/100ML
INJECTION, SOLUTION INTRAVENOUS CONTINUOUS
Status: DISCONTINUED | OUTPATIENT
Start: 2022-11-21 | End: 2022-11-21

## 2022-11-21 RX ADMIN — LIDOCAINE HYDROCHLORIDE 50 MG: 10 INJECTION, SOLUTION EPIDURAL; INFILTRATION; INTRACAUDAL; PERINEURAL at 16:06:00

## 2022-11-21 RX ADMIN — MIDAZOLAM HYDROCHLORIDE 2 MG: 1 INJECTION INTRAMUSCULAR; INTRAVENOUS at 16:04:00

## 2022-11-21 RX ADMIN — SODIUM CHLORIDE, SODIUM LACTATE, POTASSIUM CHLORIDE, CALCIUM CHLORIDE: 600; 310; 30; 20 INJECTION, SOLUTION INTRAVENOUS at 16:04:00

## 2022-11-21 RX ADMIN — LIDOCAINE HYDROCHLORIDE 50 MG: 10 INJECTION, SOLUTION EPIDURAL; INFILTRATION; INTRACAUDAL; PERINEURAL at 16:07:00

## 2022-11-21 NOTE — OPERATIVE REPORT
[facility] Endoscopy Report      Date of Procedure:  11/21/22      Preoperative Diagnosis:  1. Change in bowel habits  2. Personal history of adenomatous colon polyps  3. Family history of colon cancer      Postoperative Diagnosis:  1. Diminutive colon polyps  2. Ascending colon erosions  3. Sigmoid colon diverticulosis  4. LA grade A esophagitis  5. Small hiatal hernia      Procedure:    Colonoscopy with polypectomy and biopsy  Esophagogastroduodenoscopy with biopsy and TTS balloon dilatation      Surgeon:  Michelle Conroy M.D. Anesthesia:  Monitored anesthesia care  Cecal withdrawal time: 20 minutes  EBL:  Insignificant      Brief History: This is a 63year old male who has noted a recent change in bowel habits with increased stool frequency following a trip to [address] [place] [address]. He has a history of #6 subcentimeter adenomatous polyps removed endoscopically 2 years prior. Colonoscopy is being performed to evaluate. The patient also has had episodic reflux and an episode of dysphagia in the remote past requiring \"the Heimlich maneuver\". Since that time he chews and swallows quite carefully, ever, has noted some difficulty swallowing pills. A concurrent upper endoscopy is being performed as well. Technique:  After informed consent, the patient was placed in the left lateral recumbent position. Digital rectal examination revealed no palpable intraluminal abnormalities. An Olympus variable stiffness 190 series HD colonoscope was inserted into the rectum and advanced under direct vision by following the lumen to the terminal ileum. The colon was examined upon withdrawal in the left lateral recumbent position. Following colonoscopy, an Olympus adult HD gastroscope was inserted into the hypopharynx and advanced under direct vision into the esophagus, stomach and duodenum.   The endoscope was withdrawn to the stomach where retroflexion of the angulus, body, cardia and fundus was performed. The instrument was straightened, insufflated air and fluid were suctioned and the endoscope was withdrawn. The procedures were well tolerated without immediate complication. Findings:  The preparation of the colon was very good. The terminal ileum was examined for 5 cm and visually normal.  The ileocecal valve was well preserved. The visualized colonic mucosa from the cecum to the anal verge was normal with an intact vascular pattern with the exception of the descending colon where there was a short area of erythema and erosion present. Biopsies were obtained. There were #2 polyps seen within the colon which removed as follows:    1. In the proximal ascending colon there was a 2 mm sessile polyp which was removed with a cold biopsy forceps. 2.  In the proximal rectum there was a 4 mm sessile polyp which was cold snare excised and retrieved. Inspection of all sites revealed no evidence of ongoing bleeding. There were a few subtle diverticula seen in the sigmoid colon without signs of complication. There were no other colonic polyps, mass lesions, vascular anomalies or other signs of inflammation seen. Random biopsies were taken from the right and transverse colon (in addition to the directed biopsies of the descending erosions). Retroflexion in the rectum revealed no abnormalities. The esophagus was intubated without resistance or difficulty. There was a small teardrop shaped cervical inlet patch. The remainder of the proximal and midesophagus was normal.  There was a wedge-shaped less than 5 mm in size erosion at the gastroesophageal junction. Biopsies were obtained from the distal esophagus and from the proximal esophagus at 25 cm and placed in separate containers. The GE junction (ringlike but nonobstructing) was at 41 cm and the diaphragmatic impression at 43 cm representing 1-2 cm sliding hiatal hernia. The stomach distended appropriately with insufflated air.   The mucosa of the stomach including cardia, fundus, gastric body and antrum was normal.  The duodenal bulb and post bulbar regions were normal.  Biopsies were obtained from the duodenum. Following diagnostic endoscopy a TTS balloon dilator was positioned across the gastroesophageal junction. Sequential inflations were made with the 15, 16.5 and 18 mm balloons. The balloon cath was deflated and withdrawn. There was no trauma to the junction. Impression:  1. Diminutive colon polyps  2. Ascending colon erosions  3. Uncomplicated sigmoid colon diverticulosis  4. LA grade A esophagitis  5. Small hiatal hernia  6. Status post esophageal dilatation to 47 [place] TTS without clinically significant GE junction ring or stricture    Recommendations:  1. Follow-up biopsy results. 2.  Further recommendations pending biopsy and clinical course. 3.  A trial of acid suppression can be considered if the patient remains symptomatic.           Nisha Arias MD  11/21/2022

## 2022-11-21 NOTE — ANESTHESIA POSTPROCEDURE EVALUATION
Patient: Monica Moreno [facility]    Procedure Summary     Date: 11/21/22 Room / Location: [address] [place] [address] ENDOSCOPY 01 / [address] [place] [address] ENDOSCOPY    Anesthesia Start: 9243 Anesthesia Stop: 9741    Procedures:       COLONOSCOPY /ESOPHAGOGASTRODUODENOSCOPY      ESOPHAGOGASTRODUODENOSCOPY (EGD) Diagnosis:       Personal history of colonic polyps      Family history of colon cancer      Change in bowel habits      Gastroesophageal reflux disease, unspecified whether esophagitis present      Dysphagia, unspecified type      (colon polyps, diverticulosis, descending colon erosions, esophagitis, hiatal hernia. )    Surgeons: Tori Ellison MD Anesthesiologist: Juliette Gentile CRNA    Anesthesia Type: MAC ASA Status: 2          Anesthesia Type: MAC    Vitals Value Taken Time   /67 11/21/22 1700   Temp 37.0 11/21/22 1700   Pulse 70 11/21/22 1700   Resp 18 11/21/22 1700   SpO2 100 11/21/22 1700       [facility] AN Post Evaluation:   Patient Evaluated in PACU  Patient Participation: complete - patient participated  Level of Consciousness: awake and alert  Pain Score: 0  Pain Management: adequate  Airway Patency:patent  Yes    Cardiovascular Status: acceptable  Respiratory Status: acceptable  Postoperative Hydration acceptable      Eb Dickson CRNA  11/21/2022 5:00 PM

## 2022-11-21 NOTE — DISCHARGE INSTRUCTIONS
Home Care Instructions for Colonoscopy and/or Gastroscopy with Sedation    Diet:  - Resume your regular diet as tolerated unless otherwise instructed. - Start with light meals to minimize bloating.  - Do not drink alcohol today. Medication:  - If you have questions about resuming your normal medications, please contact your Primary Care Physician. Activities:  - Take it easy today. Do not return to work today. - Do not drive today. - Do not operate any machinery today (including kitchen equipment). Colonoscopy:  - You may notice some rectal \"spotting\" (a little blood on the toilet tissue) for a day or two after the exam. This is normal.  - If you experience any rectal bleeding (not spotting), persistent tenderness or sharp severe abdominal pains, oral temperature over 100 degrees Fahrenheit, light-headedness or dizziness, or any other problems, contact your doctor. Gastroscopy:  - You may have a sore throat for 2-3 days following the exam. This is normal. Gargling with warm salt water (1/2 tsp salt to 1 glass warm water) or using throat lozenges will help. - If you experience any sharp pain in your neck, abdomen or chest, vomiting of blood, oral temperature over 100 degrees Fahrenheit, light-headedness or dizziness, or any other problems, contact your doctor. **If unable to reach your doctor, please go to the [facility] Emergency Room**    - Your referring physician will receive a full report of your examination.  - If you do not hear from your doctor's office within two weeks of your biopsy, please call them for your results. You may be able to see your laboratory results in [facility] between 4 and 7 business days. In some cases, your physician may not have viewed the results before they are released to [facility]. If you have questions regarding your results contact the physician who ordered the test/exam by phone or via [facility] by choosing \"Ask a Medical Question. \"

## 2023-01-03 ENCOUNTER — PATIENT MESSAGE (OUTPATIENT)
Facility: CLINIC | Age: 63
End: 2023-01-03

## 2023-01-03 DIAGNOSIS — R10.32 LLQ PAIN: Primary | ICD-10-CM

## 2023-01-04 NOTE — TELEPHONE ENCOUNTER
From: Charisse Florez  To: Rin Licona MD  Sent: 1/3/2023 8:57 AM CST  Subject: Follow Up from Nov 21 Colonoscopy/Endoscopy    Want to capture issues since my tests (sorry for long email)    Saturday, Dec 31st - Sunday January 1st. General weakness, tired and headache, abdominal discomfort, burping, belching. Threw up 3 times 12/31/22 - 1/1/23, approximately 8 hours apart. Last one was straight yellow bile, no discoloration at all. Finally held down liquids/soup/toast on Sunday night. Monday night 1/2/23 had dinner out and two glasses of red wine followed by burping, burning indigestion all night. Didn't sleep. Took a Tums this morning Tuesday 1/3/23 to settle stomach and threw up immediately. Note, this is the 2nd time in a month I've taken a Tums and thrown up immediately (I don't regularly take Tums or any other antacid)    Also was traveling domestically for work and had similar symptoms in early December. One night of burping, heartburn and middle of the night throwing up.  Colleague traveling with me reported the same though

## 2023-01-05 RX ORDER — ATORVASTATIN CALCIUM 20 MG/1
20 TABLET, FILM COATED ORAL NIGHTLY
Qty: 90 TABLET | Refills: 1 | Status: SHIPPED | OUTPATIENT
Start: 2023-01-05

## 2023-01-05 NOTE — TELEPHONE ENCOUNTER
Refill passed per [place] [facility] protocol. Requested Prescriptions   Pending Prescriptions Disp Refills    atorvastatin 20 MG Oral Tab 90 tablet 1     Sig: Take 1 tablet (20 mg total) by mouth nightly.        Cholesterol Medication Protocol Passed - 1/4/2023  9:29 PM        Passed - ALT in past 12 months        Passed - LDL in past 12 months        Passed - Last ALT < 80     Lab Results   Component Value Date    ALT 37 05/04/2022             Passed - Last LDL < 130     Lab Results   Component Value Date    LDL 96 05/04/2022             Passed - In person appointment or virtual visit in the past 12 mos or appointment in next 3 mos     Recent Outpatient Visits              2 months ago Gastroesophageal reflux disease, unspecified whether esophagitis present    [facility], Sofi Mae MD    Office Visit    6 months ago Left groin pain    [facility], [address], Bill Whitfield MD    Office Visit    8 months ago Annual physical exam    [facility], Bill Whitfield MD    Office Visit    11 months ago Arthritis of carpometacarpal [place]) joint of left thumb    [facility] for Lily [facility], Roro Tejada MD    Office Visit    2 years ago Annual physical exam    [facility], Bill Whitfield MD    Office Visit                                Recent Outpatient Visits              2 months ago Gastroesophageal reflux disease, unspecified whether esophagitis present    Rosalba Diaz MD    Office Visit    6 months ago Left groin pain    [facility], [address], Bill Whitfield MD    Office Visit    8 months ago Annual physical exam    [facility], Bill Whitfield MD    Office Visit    11 months ago Arthritis of carpometacarpal [place]) joint of left thumb    [facility], [address], Tutu Carrera MD    Office Visit    2 years ago Annual physical exam    Rochelle Prajapati MD    Office Visit

## 2023-01-05 NOTE — TELEPHONE ENCOUNTER
I left a message on the patient's voicemail that I returned the call. His symptoms certainly could be due to refractory acid reflux. I am sending in a prescription for pantoprazole once daily. I will contact the patient in the next few days. If the symptoms are severe the patient should be seen in the ED.

## 2023-01-06 ENCOUNTER — TELEPHONE (OUTPATIENT)
Facility: CLINIC | Age: 63
End: 2023-01-06

## 2023-01-06 NOTE — TELEPHONE ENCOUNTER
I spoke to Damion merchant. We discussed that he had a solitary subcentimeter tubular adenoma removed. The other polyp was not adenomatous. Uncomplicated diverticulosis was present. The upper endoscopy revealed LA grade A esophagitis and a small hiatal hernia. Nondysplastic intestinal metaplasia was present. It is difficult to ascertain whether this represents short segment Anne's esophagus versus intestinal metaplasia of the gastric cardia. Damion merchant had heartburn last night. He has also had malaise and left groin discomfort on an intermittent basis. It is difficult historically to determine whether this is related to his left hip, a groin pull, inguinal hernia or diverticulitis. He has picked up the pantoprazole and will start therapy. Symptomatic response to be assessed. I have also recommended the followin. Surveillance colonoscopy in 5 years. 2.  Surveillance EGD in 3 years. 3.  CT scan of the abdomen and pelvis. GI RNs: Please enter the above recalls.

## 2023-01-06 NOTE — TELEPHONE ENCOUNTER
Recall colon in 5 years per Dr Mateo Prajapati.  Colon done 11/21/2022    Health maintenance updated and message sent to pt outreach to repeat colonoscopy in 5 years

## 2023-01-06 NOTE — TELEPHONE ENCOUNTER
Recall EGD in 3 years per Dr Isabel Garcia . EGD done 11/21/2022 .  Due 11/21/2025    Pt outreach updated     Specialty comments updated to reflect 3 year f/u EGD

## 2023-01-13 ENCOUNTER — TELEPHONE (OUTPATIENT)
Facility: CLINIC | Age: 63
End: 2023-01-13

## 2023-01-16 ENCOUNTER — TELEPHONE (OUTPATIENT)
Facility: CLINIC | Age: 63
End: 2023-01-16

## 2023-01-16 NOTE — TELEPHONE ENCOUNTER
PA for CT abdomen/pelvis approved. See notes in referral.    The patient is scheduled for CT on 1/17/2023.

## 2023-01-17 ENCOUNTER — HOSPITAL ENCOUNTER (OUTPATIENT)
Dept: CT IMAGING | Facility: HOSPITAL | Age: 63
Discharge: HOME | End: 2023-01-17
Attending: INTERNAL MEDICINE
Payer: COMMERCIAL

## 2023-01-17 DIAGNOSIS — R10.32 LLQ PAIN: ICD-10-CM

## 2023-01-17 LAB
CREAT BLD-MCNC: 1.1 MG/DL
GFR SERPLBLD BASED ON 1.73 SQ M-ARVRAT: 77 ML/MIN/1.73M2 (ref 60–?)

## 2023-01-17 PROCEDURE — 82565 ASSAY OF CREATININE: CPT

## 2023-01-17 PROCEDURE — 74177 CT ABD & PELVIS W/CONTRAST: CPT | Performed by: INTERNAL MEDICINE

## 2023-01-27 ENCOUNTER — OFFICE VISIT (OUTPATIENT)
Dept: INTERNAL MEDICINE CLINIC | Facility: CLINIC | Age: 63
End: 2023-01-27

## 2023-01-27 VITALS
SYSTOLIC BLOOD PRESSURE: 131 MMHG | HEART RATE: 63 BPM | WEIGHT: 221.81 LBS | DIASTOLIC BLOOD PRESSURE: 72 MMHG | BODY MASS INDEX: 29.4 KG/M2 | HEIGHT: 73 IN

## 2023-01-27 DIAGNOSIS — N40.0 ENLARGED PROSTATE: Primary | ICD-10-CM

## 2023-01-27 PROCEDURE — 99213 OFFICE O/P EST LOW 20 MIN: CPT | Performed by: INTERNAL MEDICINE

## 2023-01-27 PROCEDURE — 3075F SYST BP GE 130 - 139MM HG: CPT | Performed by: INTERNAL MEDICINE

## 2023-01-27 PROCEDURE — 3078F DIAST BP <80 MM HG: CPT | Performed by: INTERNAL MEDICINE

## 2023-01-27 PROCEDURE — 3008F BODY MASS INDEX DOCD: CPT | Performed by: INTERNAL MEDICINE

## 2023-03-06 RX ORDER — PANTOPRAZOLE SODIUM 40 MG/1
40 TABLET, DELAYED RELEASE ORAL
Qty: 90 TABLET | Refills: 2 | Status: SHIPPED | OUTPATIENT
Start: 2023-03-06

## 2023-04-04 RX ORDER — PANTOPRAZOLE SODIUM 40 MG/1
40 TABLET, DELAYED RELEASE ORAL
Qty: 90 TABLET | Refills: 2 | Status: SHIPPED | OUTPATIENT
Start: 2023-04-04

## 2023-04-04 RX ORDER — ATORVASTATIN CALCIUM 20 MG/1
20 TABLET, FILM COATED ORAL NIGHTLY
Qty: 90 TABLET | Refills: 3 | Status: SHIPPED | OUTPATIENT
Start: 2023-04-04

## 2023-04-04 NOTE — TELEPHONE ENCOUNTER
Refill passed per [place] [employer] [place] protocol. Requested Prescriptions   Pending Prescriptions Disp Refills    atorvastatin 20 MG Oral Tab 90 tablet 1     Sig: Take 1 tablet (20 mg total) by mouth nightly.        Cholesterol Medication Protocol Passed - 4/4/2023 12:50 PM        Passed - ALT in past 12 months        Passed - LDL in past 12 months        Passed - Last ALT < 80     Lab Results   Component Value Date    ALT 37 05/04/2022             Passed - Last LDL < 130     Lab Results   Component Value Date    LDL 96 05/04/2022             Passed - In person appointment or virtual visit in the past 12 mos or appointment in next 3 mos     Recent Outpatient Visits              2 months ago Enlarged prostate    Dorcas Durbin MD    Office Visit    5 months ago Gastroesophageal reflux disease, unspecified whether esophagitis present    Alison Mantilla MD    Office Visit    9 months ago Left groin pain    Kushal Echeverria MD    Office Visit    11 months ago Annual physical exam    Kushal Echeverria MD    Office Visit    1 year ago Arthritis of carpometacarpal [place]) joint of left thumb    Carmel Echeverria MD    Office Visit                            Recent Outpatient Visits              2 months ago Enlarged prostate    [facility], Kushal Wyman MD    Office Visit    5 months ago Gastroesophageal reflux disease, unspecified whether esophagitis present    Alison Mantilla MD    Office Visit    9 months ago Left groin pain    Kushal Echeverria MD    Office Visit    11 months ago Annual physical exam [address], Nicky Mak MD    Office Visit    1 year ago Arthritis of carpometacarpal [place]) joint of left thumb    [address], Bobby Mcmahon MD    Office Visit

## 2023-04-17 RX ORDER — PANTOPRAZOLE SODIUM 40 MG/1
40 TABLET, DELAYED RELEASE ORAL
Qty: 90 TABLET | Refills: 2 | Status: SHIPPED | OUTPATIENT
Start: 2023-04-17

## 2023-04-21 ENCOUNTER — TELEPHONE (OUTPATIENT)
Facility: CLINIC | Age: 63
End: 2023-04-21

## 2023-04-21 NOTE — TELEPHONE ENCOUNTER
PPD,    Please assist with PA for pantoprazole 40mg.       Gastroesophageal reflux disease, unspecified whether esophagitis present K21.9      Dysphagia R10.13

## 2024-01-08 RX ORDER — PANTOPRAZOLE SODIUM 40 MG/1
40 TABLET, DELAYED RELEASE ORAL
Qty: 90 TABLET | Refills: 0 | Status: SHIPPED | OUTPATIENT
Start: 2024-01-08

## 2024-01-08 NOTE — TELEPHONE ENCOUNTER
Requested Prescriptions     Pending Prescriptions Disp Refills    PANTOPRAZOLE 40 MG Oral Tab EC [Pharmacy Med Name: Pantoprazole Sodium Ec 40 Mg Tab Auro] 90 tablet 0     Sig: Take 1 tablet (40 mg total) by mouth every morning before breakfast.     LOV: 11/4/2022  Last Refill: 04/17/2023    Left message advising OV is needed.

## 2024-01-08 NOTE — TELEPHONE ENCOUNTER
Please contact the patient.  I have refilled the prescription for 3 months.  He should be seen in the office in follow-up for further refills.  Alternatively the prescription could be filled by his PCP.  Patient preference.

## 2024-03-25 ENCOUNTER — OFFICE VISIT (OUTPATIENT)
Dept: INTERNAL MEDICINE CLINIC | Facility: CLINIC | Age: 64
End: 2024-03-25
Payer: COMMERCIAL

## 2024-03-25 VITALS
DIASTOLIC BLOOD PRESSURE: 78 MMHG | SYSTOLIC BLOOD PRESSURE: 136 MMHG | WEIGHT: 223.19 LBS | HEART RATE: 64 BPM | BODY MASS INDEX: 29.58 KG/M2 | HEIGHT: 73 IN

## 2024-03-25 DIAGNOSIS — Z00.00 ANNUAL PHYSICAL EXAM: Primary | ICD-10-CM

## 2024-03-25 DIAGNOSIS — E78.00 HYPERCHOLESTEROLEMIA: ICD-10-CM

## 2024-03-25 DIAGNOSIS — D12.6 ADENOMATOUS POLYP OF COLON, UNSPECIFIED PART OF COLON: ICD-10-CM

## 2024-03-25 PROCEDURE — 90472 IMMUNIZATION ADMIN EACH ADD: CPT | Performed by: INTERNAL MEDICINE

## 2024-03-25 PROCEDURE — 90750 HZV VACC RECOMBINANT IM: CPT | Performed by: INTERNAL MEDICINE

## 2024-03-25 PROCEDURE — 90471 IMMUNIZATION ADMIN: CPT | Performed by: INTERNAL MEDICINE

## 2024-03-25 PROCEDURE — 90715 TDAP VACCINE 7 YRS/> IM: CPT | Performed by: INTERNAL MEDICINE

## 2024-03-25 PROCEDURE — 99396 PREV VISIT EST AGE 40-64: CPT | Performed by: INTERNAL MEDICINE

## 2024-03-25 NOTE — H&P
Bg Florez is a 63 year old male who presents for a complete physical exam.   HPI:   His last physical was May 2022.  He did want to mention several issues which he has recently noticed.  Occasional numbness in multiple fingertips when exposed to the cold.  Occasional urinary urgency without other urinary symptoms.  Occasional fatigue in the afternoon, resolved with taking a nap.  Occasional itchiness around both ankles without associated rash.  Occasional low back and bilateral hip pain from known osteoarthritis.  He also has several moles which he would like to have checked-recommend appointment with Dermatology.    Past medical and past surgical histories reviewed, as outlined below.  Medications reviewed, as listed.  Medication allergies reviewed-none    He tries to watch his diet but admits to frequent snacking.  He has been exercising 3 days weekly, stationary bike, yoga and weights.  Weight up 12 pounds since physical May 2022.    Next colonoscopy due November 2027.  Next EGD due November 2025.    Wt Readings from Last 4 Encounters:   03/25/24 223 lb 3.2 oz (101.2 kg)   01/27/23 221 lb 12.8 oz (100.6 kg)   11/21/22 215 lb (97.5 kg)   11/04/22 215 lb (97.5 kg)     Body mass index is 29.45 kg/m².     Current Outpatient Medications   Medication Sig Dispense Refill    pantoprazole 40 MG Oral Tab EC Take 1 tablet (40 mg total) by mouth every morning before breakfast. 90 tablet 0    atorvastatin 20 MG Oral Tab Take 1 tablet (20 mg total) by mouth nightly. 90 tablet 3     No Known Allergies   Past Medical History:   Diagnosis Date    Colon polyps 10/01/2013    Hypercholesterolemia       Past Surgical History:   Procedure Laterality Date    COLONOSCOPY N/A 11/21/2022    Procedure: COLONOSCOPY /ESOPHAGOGASTRODUODENOSCOPY;  Surgeon: Andrea Rucker MD;  Location: [facility] ENDOSCOPY    EYE SURGERY Bilateral December 2007    Bilateral blepharoplasty    OTHER  January 2009    Excision benign cyst right maxilla     VASECTOMY  1996      Family History   Problem Relation Age of Onset    Cancer Father         Lymphoma and leukemia    Other (AAA) Father     Colon Cancer Mother 73    Melanoma Mother     Hypertension Mother     Other (Lung Cancer) Mother 70    Hypertension Brother       Social History:  Social History     Socioeconomic History    Marital status:    Tobacco Use    Smoking status: Never    Smokeless tobacco: Never   Vaping Use    Vaping Use: Never used   Substance and Sexual Activity    Alcohol use: Yes     Comment: 2 glasses of wine  a day, 5-6 days weekly    Drug use: No   Other Topics Concern    Caffeine Concern Yes     Comment: coffee, 2cups/day           REVIEW OF SYSTEMS:   GENERAL: No fever  LUNGS: No cough wheezing or shortness of breath  CARDIAC: No lightheadedness palpitations or chest pain  GI: No anorexia heartburn dysphagia nausea vomiting abdominal pain diarrhea constipation or rectal bleeding  : Occasional nocturia x 1 but not nightly.  No urinary frequency, weak urinary stream, dysuria or hematuria  MUSCULOSKELETAL: No leg swelling  NEURO: No headaches    EXAM:   GENERAL: Pleasant male appearing well in no distress  /78   Pulse 64   Ht 6' 1\" (1.854 m)   Wt 223 lb 3.2 oz (101.2 kg)   BMI 29.45 kg/m²   HEENT: Anicteric, conjunctiva pink, oropharynx normal  NECK: Supple without mass or thyromegaly  NODES: No peripheral adenopathy  LUNGS: Resonant to percussion and clear to auscultation  CARDIAC: Rhythm regular S1 S2 normal without murmur or edema  ABDOMEN: Bowel sounds normal soft nontender without mass or hepatosplenomegaly  EXTREMITIES: Normal without cyanosis or clubbing  PULSES: 2+ bilateral dorsalis pedis and posterior tibial  NEURO: Reflexes 2+ bilaterally and symmetric     ASSESSMENT AND PLAN:   Bg Florez is a 63 year old male who presents for a complete physical exam.     1. Annual physical exam  Tdap vaccine and first Shingrix vaccine today.  Recommend second  Shingrix in 2-6 months  Check CMP CBC lipid profile screening PSA TSH with reflex T4 B12 and urinalysis with reflex culture.  Order sent  Discussed and recommended healthy diet and regular exercise with some weight loss  Annual physical  - Comp Metabolic Panel (14); Future  - CBC, Platelet; No Differential; Future  - Lipid Panel; Future  - PSA Total, Screen; Future  - TSH W Reflex To Free T4; Future  - Vitamin B12; Future  - Urinalysis with Culture Reflex; Future    2. Hypercholesterolemia  Continue statin and await labs    3.  History of adenomatous polyp of colon, unspecified part of colon  Up-to-date on colonoscopy      Sean Granados MD  3/25/2024  12:47 PM

## 2024-03-25 NOTE — PATIENT INSTRUCTIONS
Your physical today was normal  You received a Tdap vaccine, good for 10 years, and your first Shingrix vaccine today  Recommend second Shingrix vaccine in 2-6 months  Await results of blood and urine testing  Please try to follow a healthy diet, exercise regularly and lose some weight  Annual physical

## 2024-03-28 ENCOUNTER — LAB ENCOUNTER (OUTPATIENT)
Dept: LAB | Facility: HOSPITAL | Age: 64
End: 2024-03-28
Attending: INTERNAL MEDICINE
Payer: COMMERCIAL

## 2024-03-28 DIAGNOSIS — Z00.00 ANNUAL PHYSICAL EXAM: ICD-10-CM

## 2024-03-28 LAB
ALBUMIN SERPL-MCNC: 4.5 G/DL (ref 3.2–4.8)
ALBUMIN/GLOB SERPL: 2 {RATIO} (ref 1–2)
ALP LIVER SERPL-CCNC: 85 U/L
ALT SERPL-CCNC: 46 U/L
ANION GAP SERPL CALC-SCNC: 6 MMOL/L (ref 0–18)
AST SERPL-CCNC: 33 U/L (ref ?–34)
BILIRUB SERPL-MCNC: 1.1 MG/DL (ref 0.2–1.1)
BILIRUB UR QL: NEGATIVE
BUN BLD-MCNC: 16 MG/DL (ref 9–23)
BUN/CREAT SERPL: 16.5 (ref 10–20)
CALCIUM BLD-MCNC: 9.9 MG/DL (ref 8.7–10.4)
CHLORIDE SERPL-SCNC: 106 MMOL/L (ref 98–112)
CHOLEST SERPL-MCNC: 188 MG/DL (ref ?–200)
CLARITY UR: CLEAR
CO2 SERPL-SCNC: 28 MMOL/L (ref 21–32)
COMPLEXED PSA SERPL-MCNC: 0.43 NG/ML (ref ?–4)
CREAT BLD-MCNC: 0.97 MG/DL
DEPRECATED RDW RBC AUTO: 42.7 FL (ref 35.1–46.3)
EGFRCR SERPLBLD CKD-EPI 2021: 89 ML/MIN/1.73M2 (ref 60–?)
ERYTHROCYTE [DISTWIDTH] IN BLOOD BY AUTOMATED COUNT: 12.2 % (ref 11–15)
FASTING PATIENT LIPID ANSWER: YES
FASTING STATUS PATIENT QL REPORTED: YES
GLOBULIN PLAS-MCNC: 2.3 G/DL (ref 2.8–4.4)
GLUCOSE BLD-MCNC: 95 MG/DL (ref 70–99)
GLUCOSE UR-MCNC: NORMAL MG/DL
HCT VFR BLD AUTO: 41.6 %
HDLC SERPL-MCNC: 54 MG/DL (ref 40–59)
HGB BLD-MCNC: 14.9 G/DL
HGB UR QL STRIP.AUTO: NEGATIVE
KETONES UR-MCNC: NEGATIVE MG/DL
LDLC SERPL CALC-MCNC: 117 MG/DL (ref ?–100)
LEUKOCYTE ESTERASE UR QL STRIP.AUTO: NEGATIVE
MCH RBC QN AUTO: 33.9 PG (ref 26–34)
MCHC RBC AUTO-ENTMCNC: 35.8 G/DL (ref 31–37)
MCV RBC AUTO: 94.8 FL
NITRITE UR QL STRIP.AUTO: NEGATIVE
NONHDLC SERPL-MCNC: 134 MG/DL (ref ?–130)
OSMOLALITY SERPL CALC.SUM OF ELEC: 291 MOSM/KG (ref 275–295)
PH UR: 6.5 [PH] (ref 5–8)
PLATELET # BLD AUTO: 164 10(3)UL (ref 150–450)
POTASSIUM SERPL-SCNC: 3.8 MMOL/L (ref 3.5–5.1)
PROT SERPL-MCNC: 6.8 G/DL (ref 5.7–8.2)
PROT UR-MCNC: NEGATIVE MG/DL
RBC # BLD AUTO: 4.39 X10(6)UL
SODIUM SERPL-SCNC: 140 MMOL/L (ref 136–145)
SP GR UR STRIP: 1.02 (ref 1–1.03)
TRIGL SERPL-MCNC: 92 MG/DL (ref 30–149)
TSI SER-ACNC: 3.56 MIU/ML (ref 0.55–4.78)
UROBILINOGEN UR STRIP-ACNC: NORMAL
VIT B12 SERPL-MCNC: 363 PG/ML (ref 211–911)
VLDLC SERPL CALC-MCNC: 16 MG/DL (ref 0–30)
WBC # BLD AUTO: 4.8 X10(3) UL (ref 4–11)

## 2024-03-28 PROCEDURE — 80053 COMPREHEN METABOLIC PANEL: CPT

## 2024-03-28 PROCEDURE — 85027 COMPLETE CBC AUTOMATED: CPT

## 2024-03-28 PROCEDURE — 81003 URINALYSIS AUTO W/O SCOPE: CPT

## 2024-03-28 PROCEDURE — 36415 COLL VENOUS BLD VENIPUNCTURE: CPT

## 2024-03-28 PROCEDURE — 80061 LIPID PANEL: CPT

## 2024-03-28 PROCEDURE — 82607 VITAMIN B-12: CPT

## 2024-03-28 PROCEDURE — 84443 ASSAY THYROID STIM HORMONE: CPT

## 2024-06-10 ENCOUNTER — NURSE ONLY (OUTPATIENT)
Dept: INTERNAL MEDICINE CLINIC | Facility: CLINIC | Age: 64
End: 2024-06-10

## 2024-06-10 DIAGNOSIS — Z23 NEED FOR VACCINATION: Primary | ICD-10-CM

## 2024-06-10 PROCEDURE — 90750 HZV VACC RECOMBINANT IM: CPT | Performed by: INTERNAL MEDICINE

## 2024-06-10 PROCEDURE — 90471 IMMUNIZATION ADMIN: CPT | Performed by: INTERNAL MEDICINE

## 2024-06-10 NOTE — PROGRESS NOTES
Pt presented for 2nd shingles vaccine, confirmed name and , confirmed reason for visit, pt received 2nd shingles on right deltoid, pt tolerated injection well

## 2024-09-27 RX ORDER — PANTOPRAZOLE SODIUM 40 MG/1
40 TABLET, DELAYED RELEASE ORAL
Qty: 90 TABLET | Refills: 0 | Status: SHIPPED | OUTPATIENT
Start: 2024-09-27

## 2024-09-27 NOTE — TELEPHONE ENCOUNTER
Requested Prescriptions     Pending Prescriptions Disp Refills    pantoprazole 40 MG Oral Tab EC 90 tablet 0     Sig: Take 1 tablet (40 mg total) by mouth every morning before breakfast.       LOV  11/04/2022  LR   1/08/2024  Routed to MD on call    em

## 2024-09-29 RX ORDER — ATORVASTATIN CALCIUM 20 MG/1
20 TABLET, FILM COATED ORAL NIGHTLY
Qty: 90 TABLET | Refills: 0 | Status: SHIPPED | OUTPATIENT
Start: 2024-09-29

## 2024-09-29 NOTE — TELEPHONE ENCOUNTER
Atorvastatin Calcium 20 Mg Tab Nort          Will file in chart as: ATORVASTATIN 20 MG Oral Tab    Sig: Take 1 tablet (20 mg total) by mouth nightly.    Disp: 90 tablet    Refills: 0    Start: 9/26/2024    Class: Normal    Non-formulary    To pharmacy: Prescriber changed. Previous call reason Refill Request.    Last ordered: 1 year ago (4/4/2023) by Sean Granados MD    Last refill: 6/29/2024    Rx #: [id number]    Cholesterol Medication Protocol [id number]/26/2024 02:24 PM   Protocol Details ALT < 80    ALT resulted within past year    Lipid panel within past 12 months    In person appointment or virtual visit in the past 12 mos or appointment in next 3 mos      LOV 3/25/24   RTC 1 year  Filled 4/4/23 90 tabs 3 refills   Future Appointments   Date Time Provider Department Center   11/20/2024  1:00 PM Andrea Rucker MD [id number]

## 2024-11-20 ENCOUNTER — OFFICE VISIT (OUTPATIENT)
Facility: CLINIC | Age: 64
End: 2024-11-20
Payer: COMMERCIAL

## 2024-11-20 VITALS
WEIGHT: 222 LBS | SYSTOLIC BLOOD PRESSURE: 133 MMHG | BODY MASS INDEX: 29.42 KG/M2 | HEART RATE: 73 BPM | HEIGHT: 73 IN | DIASTOLIC BLOOD PRESSURE: 81 MMHG

## 2024-11-20 DIAGNOSIS — Z86.0100 HISTORY OF COLON POLYPS: ICD-10-CM

## 2024-11-20 DIAGNOSIS — Z80.0 FAMILY HISTORY OF COLON CANCER: ICD-10-CM

## 2024-11-20 DIAGNOSIS — K21.00 GASTROESOPHAGEAL REFLUX DISEASE WITH ESOPHAGITIS WITHOUT HEMORRHAGE: Primary | ICD-10-CM

## 2024-11-20 PROCEDURE — 99213 OFFICE O/P EST LOW 20 MIN: CPT | Performed by: INTERNAL MEDICINE

## 2024-11-20 RX ORDER — PANTOPRAZOLE SODIUM 40 MG/1
40 TABLET, DELAYED RELEASE ORAL
Qty: 90 TABLET | Refills: 3 | Status: SHIPPED | OUTPATIENT
Start: 2024-11-20

## 2024-11-20 NOTE — PATIENT INSTRUCTIONS
1.  Continue pantoprazole once daily.  2.  You are due for an upper endoscopy in November 2025.  3.  You are due for a colonoscopy in November 2027.  4.  Please contact me with any changes.

## 2024-11-20 NOTE — PROGRESS NOTES
Subjective:   Patient ID: Bg Florez is a 64 year old male.    HPI  Bg returns in follow-up.  He was last seen at endoscopy in November 2022.    As per previous notes Bg has a history of gastroesophageal reflux (heartburn), hiccups and dysphagia.  Upper endoscopy in November 2022 revealed LA grade A esophagitis and a nonobstructing gastroesophageal junction ring.  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  It was difficult to ascertain whether this represented short segment Anne's esophagus or intestinal metaplasia of the gastric cardia.  A 3-year surveillance examination was advised.  Bg was placed on pantoprazole.  A concurrent colonoscopy performed for a personal history of colon polyps and a family history of colon cancer revealed a solitary subcentimeter tubular adenoma and uncomplicated diverticulosis.  A surveillance colonoscopy in 5 years was advised.  A CT scan of the abdomen and pelvis performed for left lower quadrant pain in January 2023 revealed small fat-containing inguinal herniae and hip arthritis.    Current history:  Bg has been well since his last visit.  He had not been diligent taking pantoprazole in the morning.  He stopped the medication and noted a prompt recurrence of heartburn.  He is now back on therapy and has been more diligent with compliance.  He is currently asymptomatic.  He denies dysphagia.  He has no abdominal pain.  Bg continues to have bilateral groin pain and is scheduled for a right hip total arthroplasty followed by interventional left total hip arthroplasty.  Isolated abdominal pain occurred when eating three quarters of a cup of uncooked black beans.  Otherwise he has had no abdominal pain.  His bowel movements have otherwise been regular.  He has noted no bleeding.    Subjective wellbeing from a gastrointestinal standpoint is good.    History/Other:   Review of Systems  See above    Wt Readings from Last 6  Encounters:   11/20/24 222 lb (100.7 kg)   03/25/24 223 lb 3.2 oz (101.2 kg)   01/27/23 221 lb 12.8 oz (100.6 kg)   11/21/22 215 lb (97.5 kg)   11/04/22 215 lb (97.5 kg)   06/29/22 214 lb 8 oz (97.3 kg)       Current Outpatient Medications   Medication Sig Dispense Refill    pantoprazole 40 MG Oral Tab EC Take 1 tablet (40 mg total) by mouth every morning before breakfast. 90 tablet 3    ATORVASTATIN 20 MG Oral Tab Take 1 tablet (20 mg total) by mouth nightly. 90 tablet 0     Allergies:Allergies[1]    Objective:   Physical Exam  Vitals and nursing note reviewed.   Constitutional:       General: He is not in acute distress.     Appearance: He is well-developed. He is not ill-appearing, toxic-appearing or diaphoretic.   HENT:      Mouth/Throat:      Pharynx: No oropharyngeal exudate.   Eyes:      General: No scleral icterus.     Conjunctiva/sclera: Conjunctivae normal.   Neck:      Thyroid: No thyromegaly.   Cardiovascular:      Rate and Rhythm: Normal rate and regular rhythm.      Heart sounds: Normal heart sounds. No murmur heard.  Pulmonary:      Effort: Pulmonary effort is normal. No respiratory distress.      Breath sounds: Normal breath sounds. No wheezing or rales.   Abdominal:      General: Bowel sounds are normal. There is no distension.      Palpations: Abdomen is soft. There is no mass.      Tenderness: There is no abdominal tenderness. There is no guarding or rebound.   Musculoskeletal:      Cervical back: Neck supple.   Lymphadenopathy:      Cervical: No cervical adenopathy.   Neurological:      Mental Status: He is alert and oriented to person, place, and time.   Psychiatric:         Behavior: Behavior normal.       Component      Latest Ref Rng 3/28/2024   Glucose      70 - 99 mg/dL 95    Sodium      136 - 145 mmol/L 140    Potassium      3.5 - 5.1 mmol/L 3.8    Chloride      98 - 112 mmol/L 106    Carbon Dioxide, Total      21.0 - 32.0 mmol/L 28.0    ANION GAP      0 - 18 mmol/L 6    BUN      9 - 23  mg/dL 16    CREATININE      0.70 - 1.30 mg/dL 0.97    BUN/CREATININE RATIO      10.0 - 20.0  16.5    CALCIUM      8.7 - 10.4 mg/dL 9.9    CALCULATED OSMOLALITY      275 - 295 mOsm/kg 291    EGFR      >=60 mL/min/1.73m2 89    ALT (SGPT)      10 - 49 U/L 46    AST (SGOT)      <=34 U/L 33    ALKALINE PHOSPHATASE      45 - 117 U/L 85    Total Bilirubin      0.2 - 1.1 mg/dL 1.1    PROTEIN, TOTAL      5.7 - 8.2 g/dL 6.8    Albumin      3.2 - 4.8 g/dL 4.5    Globulin      2.8 - 4.4 g/dL 2.3 (L)    A/G Ratio      1.0 - 2.0  2.0    Patient Fasting for CMP? Yes    WBC      4.0 - 11.0 x10(3) uL 4.8    RBC      4.30 - 5.70 x10(6)uL 4.39    Hemoglobin      13.0 - 17.5 g/dL 14.9    Hematocrit      39.0 - 53.0 % 41.6    MCV      80.0 - 100.0 fL 94.8    MCH      26.0 - 34.0 pg 33.9    MCHC      31.0 - 37.0 g/dL 35.8    RDW      11.0 - 15.0 % 12.2    RDW-SD      35.1 - 46.3 fL 42.7    Platelet Count      150.0 - 450.0 10(3)uL 164.0    TSH      0.550 - 4.780 mIU/mL 3.555    Vitamin B12      211 - 911 pg/mL 363       Legend:  (L) Low    Assessment & Plan:   1. Gastroesophageal reflux disease with esophagitis without hemorrhage  Bg has a history of symptomatic gastroesophageal reflux with LA grade A esophagitis and nondysplastic intestinal metaplasia of the gastric cardia versus short segment Anne's esophagus.  He is asymptomatic on PPI therapy with prompt symptom recurrence upon discontinuing the medication.  He will continue the pantoprazole once daily.  Refills for 1 year provided.  He was advised that he is due for a surveillance endoscopy in November 2025.  Mapping biopsies will be obtained as well.  Bg will contact me with any changes.    2. History of colon polyps  Bg is currently asymptomatic from a lower gastrointestinal tract standpoint and is up-to-date with colonoscopic surveillance/screening.  Bg would be due for a colonoscopy in November 2027.    3. Family history of colon cancer  See above.          Meds This  Visit:  Requested Prescriptions     Signed Prescriptions Disp Refills    pantoprazole 40 MG Oral Tab EC 90 tablet 3     Sig: Take 1 tablet (40 mg total) by mouth every morning before breakfast.       Imaging & Referrals:  None         [1] No Known Allergies

## 2024-12-13 ENCOUNTER — MED REC SCAN ONLY (OUTPATIENT)
Dept: INTERNAL MEDICINE CLINIC | Facility: CLINIC | Age: 64
End: 2024-12-13

## 2024-12-13 ENCOUNTER — TELEPHONE (OUTPATIENT)
Dept: INTERNAL MEDICINE CLINIC | Facility: CLINIC | Age: 64
End: 2024-12-13

## 2024-12-13 DIAGNOSIS — Z01.818 PREOPERATIVE EXAMINATION: Primary | ICD-10-CM

## 2024-12-13 NOTE — TELEPHONE ENCOUNTER
Patient asked if he can get an order for blood work done before his pre op appointment on 12/16/24.

## 2024-12-14 NOTE — TELEPHONE ENCOUNTER
The patient is requesting pre op clearance lab order to be drawn prior to his appointment.     He will be undergoing RTHA scheduled at the [facility] RIGHT ANTERIOR TOTAL HIP REPLACEMENT  Cornelius Snyder MD   will be performing the surgery.    Surgery is scheduled for 12/17/2024    Future Appointments   Date Time Provider Department Center   12/16/2024  1:00 PM Sean Granados MD [facility]

## 2024-12-14 NOTE — TELEPHONE ENCOUNTER
I will need to know what preoperative testing his surgeon requires.  His surgeon has not contacted me about this

## 2024-12-23 ENCOUNTER — TELEPHONE (OUTPATIENT)
Dept: INTERNAL MEDICINE CLINIC | Facility: CLINIC | Age: 64
End: 2024-12-23

## 2024-12-23 NOTE — TELEPHONE ENCOUNTER
Patient of Dr Granados needed to reschedule his pre-op clearance visit to Dec. 31st with someone else.  I scheduled with DINH Flood.    Patient has hip replacement surgery with Dr Cornelius Luna on Jan. 7th.    Call patient if DINH Flood/Dr Granados wants patient to have pre-op with someone else.  Call patient at: [phone] if change needed.    Also Dr Granados already entered the labwork.  Do the labs have to be re-entered by DINH Flood or provider that does the pre-op?    Please reply to pool: EM CC IM FM ALG RHE [[id number]]      *I tried calling site to ask, no answer.

## 2024-12-23 NOTE — TELEPHONE ENCOUNTER
Patient informed ok to keep appointment with Antonia OLSEN. Labs and EKG to be completed prior to appointment.

## 2024-12-28 RX ORDER — ATORVASTATIN CALCIUM 20 MG/1
20 TABLET, FILM COATED ORAL NIGHTLY
Qty: 90 TABLET | Refills: 3 | Status: SHIPPED | OUTPATIENT
Start: 2024-12-28

## 2024-12-28 NOTE — TELEPHONE ENCOUNTER
REFILL PASSED PER [facility] PROTOCOLS    Requested Prescriptions   Pending Prescriptions Disp Refills    ATORVASTATIN 20 MG Oral Tab [Pharmacy Med Name: Atorvastatin Calcium 20 Mg Tab Nort] 90 tablet 0     Sig: Take 1 tablet (20 mg total) by mouth nightly.       Cholesterol Medication Protocol Passed - 12/28/2024  1:26 PM        Passed - ALT < 80     Lab Results   Component Value Date    ALT 46 03/28/2024             Passed - ALT resulted within past year        Passed - Lipid panel within past 12 months     Lab Results   Component Value Date    CHOLEST 188 03/28/2024    TRIG 92 03/28/2024    HDL 54 03/28/2024     (H) 03/28/2024    VLDL 16 03/28/2024    NONHDLC 134 (H) 03/28/2024             Passed - In person appointment or virtual visit in the past 12 mos or appointment in next 3 mos     Recent Outpatient Visits              1 month ago [facility]    Office Visit    6 months ago Need for vaccination    [facility]    Nurse Only    9 months ago [facility]    [facility] Visit    1 year [facility]    [facility]    2 years ago Gastroesophageal reflux disease, unspecified whether esophagitis present    [facility]    [facility] Visit          Future Appointments         Provider Department Appt Notes    In 3 days Antonia Flood APRN [facility] pre-surgery appt; surgery on Jan 7  TE sent to site to see if apn okay to do this/Dr Granados retiring/pt has no new pcp picked out yet                         Future Appointments         Provider Department  Appt Notes    In 3 days Antonia Flood APRN [facility] pre-surgery appt; surgery on Jan 7  TE sent to site to see if twann okay to do this/Dr Granados retiring/pt has no new pcp picked out yet          Recent Outpatient Visits              1 month ago Gastroesophageal reflux disease with esophagitis without hemorrhage    [facility]    Office Visit    6 months ago Need for vaccination    [facility]    Nurse Only    9 months ago [facility]    [facility]    1 year ago Enlarged prostate    [facility]    Office Visit    2 years ago Gastroesophageal reflux disease, unspecified whether esophagitis present    [facility]    [facility] Visit

## 2024-12-31 ENCOUNTER — OFFICE VISIT (OUTPATIENT)
Dept: INTERNAL MEDICINE CLINIC | Facility: CLINIC | Age: 64
End: 2024-12-31

## 2024-12-31 ENCOUNTER — LAB ENCOUNTER (OUTPATIENT)
Dept: LAB | Facility: HOSPITAL | Age: 64
End: 2024-12-31
Attending: INTERNAL MEDICINE
Payer: COMMERCIAL

## 2024-12-31 VITALS
HEIGHT: 74 IN | HEART RATE: 70 BPM | DIASTOLIC BLOOD PRESSURE: 86 MMHG | BODY MASS INDEX: 30.26 KG/M2 | SYSTOLIC BLOOD PRESSURE: 130 MMHG | OXYGEN SATURATION: 98 % | WEIGHT: 235.81 LBS

## 2024-12-31 DIAGNOSIS — Z01.818 PREOPERATIVE EXAMINATION: ICD-10-CM

## 2024-12-31 DIAGNOSIS — Z01.818 PREOPERATIVE EXAMINATION: Primary | ICD-10-CM

## 2024-12-31 DIAGNOSIS — E78.00 HYPERCHOLESTEROLEMIA: ICD-10-CM

## 2024-12-31 DIAGNOSIS — M16.11 PRIMARY OSTEOARTHRITIS OF RIGHT HIP: ICD-10-CM

## 2024-12-31 LAB
ALBUMIN SERPL-MCNC: 4.1 G/DL (ref 3.2–4.8)
ALBUMIN/GLOB SERPL: 2 {RATIO} (ref 1–2)
ALP LIVER SERPL-CCNC: 95 U/L
ALT SERPL-CCNC: 62 U/L
ANION GAP SERPL CALC-SCNC: 5 MMOL/L (ref 0–18)
AST SERPL-CCNC: 44 U/L (ref ?–34)
ATRIAL RATE: 54 BPM
BILIRUB SERPL-MCNC: 0.6 MG/DL (ref 0.2–1.1)
BUN BLD-MCNC: 11 MG/DL (ref 9–23)
BUN/CREAT SERPL: 10.6 (ref 10–20)
CALCIUM BLD-MCNC: 9.1 MG/DL (ref 8.7–10.4)
CHLORIDE SERPL-SCNC: 108 MMOL/L (ref 98–112)
CO2 SERPL-SCNC: 29 MMOL/L (ref 21–32)
CREAT BLD-MCNC: 1.04 MG/DL
DEPRECATED RDW RBC AUTO: 42.1 FL (ref 35.1–46.3)
EGFRCR SERPLBLD CKD-EPI 2021: 81 ML/MIN/1.73M2 (ref 60–?)
ERYTHROCYTE [DISTWIDTH] IN BLOOD BY AUTOMATED COUNT: 12.1 % (ref 11–15)
FASTING STATUS PATIENT QL REPORTED: YES
GLOBULIN PLAS-MCNC: 2.1 G/DL (ref 2–3.5)
GLUCOSE BLD-MCNC: 102 MG/DL (ref 70–99)
HCT VFR BLD AUTO: 40.4 %
HGB BLD-MCNC: 13.8 G/DL
MCH RBC QN AUTO: 32.6 PG (ref 26–34)
MCHC RBC AUTO-ENTMCNC: 34.2 G/DL (ref 31–37)
MCV RBC AUTO: 95.5 FL
OSMOLALITY SERPL CALC.SUM OF ELEC: 294 MOSM/KG (ref 275–295)
P AXIS: 59 DEGREES
P-R INTERVAL: 146 MS
PLATELET # BLD AUTO: 164 10(3)UL (ref 150–450)
POTASSIUM SERPL-SCNC: 3.9 MMOL/L (ref 3.5–5.1)
PROT SERPL-MCNC: 6.2 G/DL (ref 5.7–8.2)
Q-T INTERVAL: 418 MS
QRS DURATION: 94 MS
QTC CALCULATION (BEZET): 396 MS
R AXIS: 35 DEGREES
RBC # BLD AUTO: 4.23 X10(6)UL
SODIUM SERPL-SCNC: 142 MMOL/L (ref 136–145)
T AXIS: 38 DEGREES
VENTRICULAR RATE: 54 BPM
WBC # BLD AUTO: 5.2 X10(3) UL (ref 4–11)

## 2024-12-31 PROCEDURE — 99213 OFFICE O/P EST LOW 20 MIN: CPT | Performed by: NURSE PRACTITIONER

## 2024-12-31 PROCEDURE — 85027 COMPLETE CBC AUTOMATED: CPT

## 2024-12-31 PROCEDURE — 36415 COLL VENOUS BLD VENIPUNCTURE: CPT

## 2024-12-31 PROCEDURE — 93005 ELECTROCARDIOGRAM TRACING: CPT

## 2024-12-31 PROCEDURE — 93010 ELECTROCARDIOGRAM REPORT: CPT | Performed by: STUDENT IN AN ORGANIZED HEALTH CARE EDUCATION/TRAINING PROGRAM

## 2024-12-31 PROCEDURE — 80053 COMPREHEN METABOLIC PANEL: CPT

## 2024-12-31 NOTE — PROGRESS NOTES
Subjective:   Bg Florez is a 64 year old male who presents for Pre-Op Exam (Pre-op for right hip replacement on 01/07)     Pt presents for preoperative clearance for R ant total hip. 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 Will need L hip done as well. He is looking forward to being able to bike and walk again for exercise.    Last physical 3/2024 with Dr. Granados.  EKG completed this morning - Sinus Tico - o/w normal EKG.  Completed CBC and CMP this morning - results pending  No other testing per letter from orthopedic office shown to me by patient.  No prior complications with anesthesia    Planned surgery - R anterior total hip replacement  Type of anesthesia - Spinal  Surgeon - Cornelius Luna MD - Fax [phone] [facility]  Date of surgery - 1/7/25  Cardiac history - hyperlipidemia on atorvastatin - last lipid panel 3/2024 WNL except mildly elevated LDL.  Myocardial infarction in past 6 months - No  Bleeding disorders - No  Taking blood thinners - No  Chest pain or shortness of breath with ADLs - No  Number of alcoholic beverages consumed weekly - 2 glasses wine 5-6 nights/week.  Tobacco consumption - No - never smoker    Patient has no major clinical predictors going for the above surgery.  Patient has reasonable functional capacity as described above. He has already completed testing as requested by the surgeon -  if they are acceptable, I will consider patient medically cleared for surgery as low risk for perioperative cardiac events with routine perioperative care.  DVT prophylaxis per surgeon protocol if applicable.  The above risk assessment was discussed with the patient and would like to proceed with the surgery.      History/Other:    Chief Complaint Reviewed and Verified  Nursing Notes Reviewed and   Verified  Tobacco  Reviewed  Allergies Reviewed  Medications Reviewed         Tobacco:  He has never smoked tobacco.    Current Outpatient Medications   Medication Sig Dispense Refill    atorvastatin 20 MG Oral Tab Take 1 tablet (20 mg total) by mouth nightly. 90 tablet 3    pantoprazole 40 MG Oral Tab EC Take 1 tablet (40 mg total) by mouth every morning before breakfast. 90 tablet 3         Review of Systems:  Review of Systems  10 point review of systems otherwise negative with the exception of HPI and assessment and plan.    Objective:   /86 (BP Location: Left arm, Patient Position: Sitting, Cuff Size: large)   Pulse 70   Ht 6' 2\" (1.88 m)   Wt 235 lb 12.8 oz (107 kg)   SpO2 98%   BMI 30.27 kg/m²  Estimated body mass index is 30.27 kg/m² as calculated from the following:    Height as of this encounter: 6' 2\" (1.88 m).    Weight as of this encounter: 235 lb 12.8 oz (107 kg).      Physical Exam  Vitals reviewed.   Constitutional:       Appearance: He is well-developed.   HENT:      Head: Normocephalic.      Right Ear: Tympanic membrane normal.      Left Ear: Tympanic membrane normal.      Nose: Nose normal.      Mouth/Throat:      Mouth: Mucous membranes are moist.      Pharynx: Oropharynx is clear.   Eyes:      Conjunctiva/sclera: Conjunctivae normal.   Cardiovascular:      Rate and Rhythm: Normal rate and regular rhythm.      Pulses: Normal pulses.           Radial pulses are 2+ on the right side and 2+ on the left side.        Dorsalis pedis pulses are 2+ on the right side and 2+ on the left side.      Heart sounds: Normal heart sounds.   Pulmonary:      Effort: Pulmonary effort is normal.      Breath sounds: Normal breath sounds.   Abdominal:      General: Bowel sounds are normal.      Palpations: Abdomen is soft.   Musculoskeletal:      Right lower leg: No edema.      Left lower leg: No edema.   Skin:     General: Skin is warm and dry.   Neurological:      Mental Status: He is alert and oriented to person, place,  and time.      Deep Tendon Reflexes: Reflexes are normal and symmetric.       Assessment & Plan:   1. Preoperative examination (Primary)  - as above - he completed EKG and labs this morning - lab results pending.  2. Primary osteoarthritis of right hip  - surgery planned 1/7/25.  3. Hypercholesterolemia  - well-controlled on current regimen.    He will be cleared for his procedure pending lab results.    ADDENDUM:  Preoperative testing reviewed - mild elevation of liver enzymes.  Patient is cleared for upcoming surgery without further testing.  Clearance is effective for 30 days from the original encounter date.    RAÚL Martin  1/3/2025       Return in about 12 weeks (around 3/25/2025) for annual physical.    RAÚL Martin, 12/31/2024, 7:43 AM     This note was prepared using Dragon Medical voice recognition dictation software. As a result errors may occur. When identified, these errors have been corrected. While every attempt is made to correct errors during dictation discrepancies may still exist.

## 2025-01-02 ENCOUNTER — PATIENT MESSAGE (OUTPATIENT)
Dept: INTERNAL MEDICINE CLINIC | Facility: CLINIC | Age: 65
End: 2025-01-02

## 2025-01-03 NOTE — TELEPHONE ENCOUNTER
Pre-op labs, EKG, H&P complete - please fax to Dr. Luna's office (info in my note) for pre-op clearance ASAP - surgery is 1/7 - thank you!  Antonia

## 2025-01-06 NOTE — TELEPHONE ENCOUNTER
The surgeon still needs the patient's EKG tracing from his pre op appointment. His surgery is tomorrow.    Fax

## 2025-08-18 ENCOUNTER — TELEPHONE (OUTPATIENT)
Facility: CLINIC | Age: 65
End: 2025-08-18

## 2025-08-18 DIAGNOSIS — K22.70 BARRETT'S ESOPHAGUS WITHOUT DYSPLASIA: Primary | ICD-10-CM

## 2025-08-22 RX ORDER — ROSUVASTATIN CALCIUM 20 MG/1
20 TABLET, COATED ORAL NIGHTLY
COMMUNITY

## (undated) DEVICE — SNARE CAPTIFLEX MICRO-OVL OLY

## (undated) DEVICE — CONMED SCOPE SAVER BITE BLOCK, 20X27 MM: Brand: SCOPE SAVER

## (undated) DEVICE — CATH BALLOON CRE 15-18MM 5837

## (undated) DEVICE — LINE MNTR ADLT SET O2 INTMD

## (undated) DEVICE — KIT CLEAN ENDOKIT 1.1OZ GOWNX2

## (undated) DEVICE — FORCEP RADIAL JAW 4

## (undated) DEVICE — KIT ENDO ORCAPOD 160/180/190

## (undated) DEVICE — MEDI-VAC NON-CONDUCTIVE SUCTION TUBING 6MM X 1.8M (6FT.) L: Brand: CARDINAL HEALTH

## (undated) DEVICE — SYRINGE/GUAGE ASSEMBLY

## (undated) DEVICE — 35 ML SYRINGE REGULAR TIP: Brand: MONOJECT

## (undated) DEVICE — SNARE OPTMZ PLPCTM TRP

## (undated) NOTE — LETTER
AUTHORIZATION FOR SURGICAL OPERATION OR OTHER PROCEDURE    1.  I hereby authorize Dr. Wayne Berry, and [address] [place] [address] staff assigned to my case to perform the following operation and/or procedure at the [address] [place] [address]:    ____________Left Thumb cortison Time:  ________ A. M.  P.M.        Patient Name:  ______________________________________________________  (please print)      Patient signature:  ___________________________________________________             Relationship to Patient:           []  Parent

## (undated) NOTE — LETTER
[address]  Authorization for Invasive Procedure                                                                                           1. I hereby authorize Luana Vail MD, my physician and his/her assistants (if applicable), which may include medical students, residents, and/or fellows, to perform the following surgical operation/ procedure and administer such anesthesia as may be determined necessary by my physician: Operation/Procedure name (s) COLONOSCOPY /ESOPHAGOGASTRODUODENOSCOPY on Nathaniel Florez   2. I recognize that during the surgical operation/procedure, unforeseen conditions may necessitate additional or different procedures than those listed above. I, therefore, further authorize and request that the above-named surgeon, assistants, or designees perform such procedures as are, in their judgment, necessary and desirable. 3.   My surgeon/physician has discussed prior to my surgery the potential benefits, risks and side effects of this procedure; the likelihood of achieving goals; and potential problems that might occur during recuperation. They also discussed reasonable alternatives to the procedure, including risks, benefits, and side effects related to the alternatives and risks related to not receiving this procedure. I have had all my questions answered and I acknowledge that no guarantee has been made as to the result that may be obtained. 4.   Should the need arise during my operation/procedure, which includes change of level of care prior to discharge, I also consent to the administration of blood and/or blood products. Further, I understand that despite careful testing and screening of blood or blood products by collecting agencies, I may still be subject to ill effects as a result of receiving a blood transfusion and/or blood products.   The following are some, but not all, of the potential risks that can occur: fever and allergic reactions, hemolytic reactions, transmission of diseases such as Hepatitis, AIDS and Cytomegalovirus (CMV) and fluid overload. In the event that I wish to have an autologous transfusion of my own blood, or a directed donor transfusion, I will discuss this with my physician. Check only if Refusing Blood or Blood Products  I understand refusal of blood or blood products as deemed necessary by my physician may have serious consequences to my condition to include possible death. I hereby assume responsibility for my refusal and release the hospital, its personnel, and my physicians from any responsibility for the consequences of my refusal.           ____ Refuse      5. I authorize the use of any specimen, organs, tissues, body parts or foreign objects that may be removed from my body during the operation/procedure for diagnosis, research or teaching purposes and their subsequent disposal by hospital authorities. I also authorize the release of specimen test results and/or written reports to my treating physician on the hospital medical staff or other referring or consulting physicians involved in my care, at the discretion of the Pathologist or my treating physician. 6.   I consent to the photographing or videotaping of the operations or procedures to be performed, including appropriate portions of my body for medical, scientific, or educational purposes, provided my identity is not revealed by the pictures or by descriptive texts accompanying them. If the procedure has been photographed/videotaped, the surgeon will obtain the original picture, image, videotape or CD. The hospital will not be responsible for storage, release or maintenance of the picture, image, tape or CD.    7.   I consent to the presence of a  or observers in the operating room as deemed necessary by my physician or their designees.     8.   I recognize that in the event my procedure results in extended X-Ray/fluoroscopy time, I may develop a skin reaction. 9. If I have a Do Not Attempt Resuscitation (DNAR) order in place, that status will be suspended while in the operating room, procedural suite, and during the recovery period unless otherwise explicitly stated by me (or a person authorized to consent on my behalf). The surgeon or my attending physician will determine when the applicable recovery period ends for purposes of reinstating the DNAR order. 10. Patients having a sterilization procedure: I understand that if the procedure is successful the results will be permanent and it will therefore be impossible for me to inseminate, conceive, or bear children. I also understand that the procedure is intended to result in sterility, although the result has not been guaranteed. 11. I acknowledge that my physician has explained sedation/analgesia administration to me including the risk and benefits I consent to the administration of sedation/analgesia as may be necessary or desirable in the judgment of my physician. I CERTIFY THAT I HAVE READ AND FULLY UNDERSTAND THE ABOVE CONSENT TO OPERATION and/or OTHER PROCEDURE.     _________________________________________ _________________________________     ___________________________________  Signature of Patient     Signature of Responsible Person                   Printed Name of Responsible Person                              _________________________________________ ______________________________        ___________________________________  Signature of Witness         Date  Time         Relationship to Patient    STATEMENT OF PHYSICIAN My signature below affirms that prior to the time of the procedure; I have explained to the patient and/or his/her legal representative, the risks and benefits involved in the proposed treatment and any reasonable alternative to the proposed treatment.  I have also explained the risks and benefits involved in refusal of the proposed treatment and alternatives to the proposed treatment and have answered the patient's questions.  If I have a significant financial interest in a co-management agreement or a significant financial interest in any product or implant, or other significant relationship used in this procedure/surgery, I have disclosed this and had a discussion with my patient.     _______________________________________________________________ _____________________________  (Signature of Physician)                                                                                         (Date)                                   (Time)  Patient Name: Canelo Archer Raiayaz    : 1960   Printed: 11/15/2022      Medical Record #: [id number]                                              Page 1 of 1